# Patient Record
Sex: MALE | Race: WHITE | Employment: FULL TIME | URBAN - METROPOLITAN AREA
[De-identification: names, ages, dates, MRNs, and addresses within clinical notes are randomized per-mention and may not be internally consistent; named-entity substitution may affect disease eponyms.]

---

## 2017-03-25 ENCOUNTER — OFFICE VISIT (OUTPATIENT)
Dept: FAMILY MEDICINE CLINIC | Facility: CLINIC | Age: 37
End: 2017-03-25

## 2017-03-25 VITALS
BODY MASS INDEX: 18 KG/M2 | WEIGHT: 128 LBS | TEMPERATURE: 99 F | SYSTOLIC BLOOD PRESSURE: 112 MMHG | DIASTOLIC BLOOD PRESSURE: 60 MMHG

## 2017-03-25 DIAGNOSIS — H65.93 OME (OTITIS MEDIA WITH EFFUSION), BILATERAL: ICD-10-CM

## 2017-03-25 DIAGNOSIS — J01.01 ACUTE RECURRENT MAXILLARY SINUSITIS: Primary | ICD-10-CM

## 2017-03-25 PROCEDURE — 99213 OFFICE O/P EST LOW 20 MIN: CPT | Performed by: PHYSICIAN ASSISTANT

## 2017-03-25 RX ORDER — MOMETASONE 50 UG/1
2 SPRAY, METERED NASAL DAILY
Qty: 17 G | Refills: 0 | Status: SHIPPED | OUTPATIENT
Start: 2017-03-25 | End: 2019-06-05

## 2017-03-25 RX ORDER — CEFDINIR 300 MG/1
300 CAPSULE ORAL 2 TIMES DAILY
Qty: 20 CAPSULE | Refills: 0 | Status: SHIPPED | OUTPATIENT
Start: 2017-03-25 | End: 2017-04-04

## 2017-03-25 NOTE — PROGRESS NOTES
CHIEF COMPLAINT:   Patient presents with:  Cough/URI: x 8 days, increased left sided facial pressure c/w previous h/o sinusitis, thick post nasal drainage, nasal congestion, L ear popping/pressure      HPI:   Pipo Ness is a 39year old male who pr • Other nonspecific abnormal serum enzyme levels      Alkaline Phosphatase Elevated   • Sinusitis    • Vitamin D deficiency    • Ulcer (HCC)           Past Surgical History    OTHER SURGICAL HISTORY      Comment Ileostomy, created J pouch and connected to LUNGS: clear to auscultation bilaterally, no wheezes or rhonchi. Breathing is non labored. CARDIO: RRR without murmur  EXTREMITIES: no cyanosis, clubbing or edema  LYMPH:  (+)ant cervical lymphadenopathy.         ASSESSMENT AND PLAN:   Dennise Bñauelos i 3. Encourage fluids, humidifier/vaporizor at bedside, elevate head of bed (sleep with extra pillow), vapor rub to chest, steam therapy if no fever, warm compresses for sinus pressure if no fever, salt water gargles for sore throat, lozenges for sore throat Your doctor may prescribe medications to help treat your sinusitis. If you have an infection, antibiotics can help clear it up. If you are prescribed antibiotics, take all pills on schedule until they are gone, even if you feel better.  Decongestants help r

## 2017-03-25 NOTE — PATIENT INSTRUCTIONS
1.  Omnicef 300 mg twice daily for 10 days. Recommend probiotics while on this medication to prevent antibiotics associated diarrhea or yeast infections.   Examples include yogurt with active live cultures; or in capsule/granule forms such as Florastor, Cu Rinses help keep your sinuses and nose moist. Mix a teaspoon of salt in 8 ounces of fresh, warm water. Use a bulb syringe to gently squirt the water into your nose a few times a day. You can also buy ready-made saline nasal sprays.   Apply hot or cold packs

## 2017-06-16 ENCOUNTER — OFFICE VISIT (OUTPATIENT)
Dept: FAMILY MEDICINE CLINIC | Facility: CLINIC | Age: 37
End: 2017-06-16

## 2017-06-16 VITALS
DIASTOLIC BLOOD PRESSURE: 86 MMHG | SYSTOLIC BLOOD PRESSURE: 124 MMHG | OXYGEN SATURATION: 99 % | RESPIRATION RATE: 16 BRPM | TEMPERATURE: 99 F | HEART RATE: 110 BPM

## 2017-06-16 DIAGNOSIS — H60.502 ACUTE OTITIS EXTERNA OF LEFT EAR, UNSPECIFIED TYPE: ICD-10-CM

## 2017-06-16 DIAGNOSIS — J01.40 ACUTE NON-RECURRENT PANSINUSITIS: Primary | ICD-10-CM

## 2017-06-16 DIAGNOSIS — Z87.09 HISTORY OF BRONCHIECTASIS: ICD-10-CM

## 2017-06-16 DIAGNOSIS — J04.0 LARYNGITIS: ICD-10-CM

## 2017-06-16 DIAGNOSIS — R05.9 COUGH: ICD-10-CM

## 2017-06-16 PROCEDURE — 99213 OFFICE O/P EST LOW 20 MIN: CPT | Performed by: PHYSICIAN ASSISTANT

## 2017-06-16 RX ORDER — PREDNISONE 20 MG/1
20 TABLET ORAL DAILY
Qty: 5 TABLET | Refills: 0 | Status: SHIPPED | OUTPATIENT
Start: 2017-06-16 | End: 2017-06-21

## 2017-06-16 RX ORDER — OFLOXACIN 3 MG/ML
5 SOLUTION AURICULAR (OTIC) DAILY
Qty: 1 BOTTLE | Refills: 0 | Status: SHIPPED | OUTPATIENT
Start: 2017-06-16 | End: 2017-06-23

## 2017-06-16 RX ORDER — AMOXICILLIN AND CLAVULANATE POTASSIUM 875; 125 MG/1; MG/1
1 TABLET, FILM COATED ORAL 2 TIMES DAILY
Qty: 14 TABLET | Refills: 0 | Status: SHIPPED | OUTPATIENT
Start: 2017-06-16 | End: 2017-06-23

## 2017-06-16 NOTE — PROGRESS NOTES
CHIEF COMPLAINT:   Patient presents with:  Sinus Problem: 6 days, sinus pressure, hx of recurrent sinus infecitons in the past, sinus pressure in frontal and left side with tenderness, blowing out yellow/green phlegm  Voice Problem: lost voice last night 1000 UNITS Oral Cap Take  by mouth daily. Disp:  Rfl:    DAILY MULTIVITAMIN OR Take  by mouth daily.  Disp:  Rfl:       Past Medical History   Diagnosis Date   • Iron deficiency    • Prostatitis    • Renal tubular acidosis    • Other nonspecific abnormal se retraction bilaterally,bony landmarks intact  NOSE: nostrils patent, turbinates with mild swelling greater on the left, clear/yellow nasal mucous, nasal mucosa pink and moist  THROAT: oral mucosa pink and moist. No visible dental caries.  Posterior pharynx BASILIO  6/16/2017  10:23 AM

## 2017-06-16 NOTE — PATIENT INSTRUCTIONS
Self-Care for Sinusitis     Drinking plenty of water can help sinuses drain. Sinusitis can often be managed with self-care. Self-care can keep sinuses moist and make you feel more comfortable. Remember to follow your doctor's instructions closely.  This Understanding Bronchiectasis  Bronchiectasis is a condition in which the airways of the lungs (bronchi and bronchioles) become wider than normal. Over time the walls of the airways become thick and scarred. The damaged airways can’t clear mucus as well.  Be Your healthcare provider will ask about your past health and symptoms. You’ll also have a physical exam. This includes listening to your chest with a stethoscope. You may need tests to help with the diagnosis, including:  · Blood tests.  These check for inf

## 2018-01-27 ENCOUNTER — HOSPITAL ENCOUNTER (OUTPATIENT)
Age: 38
Discharge: HOME | End: 2018-01-27
Payer: COMMERCIAL

## 2018-01-27 VITALS
TEMPERATURE: 99 F | SYSTOLIC BLOOD PRESSURE: 100 MMHG | OXYGEN SATURATION: 98 % | DIASTOLIC BLOOD PRESSURE: 79 MMHG | HEART RATE: 89 BPM | RESPIRATION RATE: 20 BRPM

## 2018-01-27 DIAGNOSIS — J32.1 CHRONIC FRONTAL SINUSITIS: Primary | ICD-10-CM

## 2018-01-27 PROCEDURE — 99213 OFFICE O/P EST LOW 20 MIN: CPT

## 2018-01-27 PROCEDURE — 99204 OFFICE O/P NEW MOD 45 MIN: CPT

## 2018-01-27 RX ORDER — AMOXICILLIN AND CLAVULANATE POTASSIUM 875; 125 MG/1; MG/1
1 TABLET, FILM COATED ORAL 2 TIMES DAILY
Qty: 20 TABLET | Refills: 0 | Status: SHIPPED | OUTPATIENT
Start: 2018-01-27 | End: 2018-02-06

## 2018-01-27 NOTE — ED PROVIDER NOTES
[place] [id number] [facility] [place]   Patient presents with:  Cough    Stated Complaint: CONGESTION    80-year-old male who presents to the immediate care with complaints of sinus pain/pressure/congestion ×3 weeks.   Patient states Smokeless tobacco: Never Used                      Alcohol use: Yes           0.0 oz/week     Comment: occasional      Review of Systems   Constitutional: Negative for chills.    HENT: Positive for congestion, postnasal drip, sinus pain and sinus pres Eyes: Conjunctivae and EOM are normal. Pupils are equal, round, and reactive to light. Neck: Normal range of motion. Neck supple. Cardiovascular: Normal rate, regular rhythm and normal heart sounds.     Pulmonary/Chest: Effort normal and breath sounds

## 2018-03-26 ENCOUNTER — OFFICE VISIT (OUTPATIENT)
Dept: FAMILY MEDICINE CLINIC | Facility: CLINIC | Age: 38
End: 2018-03-26

## 2018-03-26 VITALS
SYSTOLIC BLOOD PRESSURE: 100 MMHG | WEIGHT: 135 LBS | TEMPERATURE: 98 F | HEART RATE: 80 BPM | OXYGEN SATURATION: 98 % | BODY MASS INDEX: 19 KG/M2 | DIASTOLIC BLOOD PRESSURE: 70 MMHG

## 2018-03-26 DIAGNOSIS — J01.00 ACUTE NON-RECURRENT MAXILLARY SINUSITIS: Primary | ICD-10-CM

## 2018-03-26 PROCEDURE — 99213 OFFICE O/P EST LOW 20 MIN: CPT | Performed by: FAMILY MEDICINE

## 2018-03-26 RX ORDER — MOMETASONE 50 UG/1
2 SPRAY, METERED NASAL DAILY
Qty: 1 BOTTLE | Refills: 1 | Status: SHIPPED | OUTPATIENT
Start: 2018-03-26 | End: 2018-04-25

## 2018-03-26 RX ORDER — CEFDINIR 300 MG/1
300 CAPSULE ORAL 2 TIMES DAILY
Qty: 20 CAPSULE | Refills: 0 | Status: SHIPPED | OUTPATIENT
Start: 2018-03-26 | End: 2018-04-24 | Stop reason: ALTCHOICE

## 2018-03-26 RX ORDER — PREDNISONE 20 MG/1
TABLET ORAL
Qty: 10 TABLET | Refills: 0 | Status: SHIPPED | OUTPATIENT
Start: 2018-03-26 | End: 2018-04-24 | Stop reason: ALTCHOICE

## 2018-03-26 NOTE — PATIENT INSTRUCTIONS
Take antibiotics with food and plenty of water. Eat yogurt or take probiotic daily. (Vyelmira Ossipee is a good example of an OTC probiotic)  Make sure to finish the entire antibiotic treatment. Take prednisone-- 2 tabs once daily for 5 days. Take with food.

## 2018-03-26 NOTE — PROGRESS NOTES
CHIEF COMPLAINT:   Patient presents with:  Sore Throat:  x 3 days. congestion, right side of throat pain, right ear and sinus presure. dark green nasal d/c. using mucinex to loosen mucus. hx of sinusitis.       HPI:   Annette Hodge is a 40year old mal • Sinusitis    • Ulcer    • Vitamin D deficiency       Past Surgical History:  12/06/2011: COLONOSCOPY  No date: OTHER SURGICAL HISTORY      Comment: Ileostomy, created J pouch and connected to                ileum                       12/23/2014: Ananth Romero RRR without murmur  EXTREMITIES: no cyanosis, clubbing or edema  LYMPH:  Mild ac/tonsillar lymphadenopathy.     NEURO:  No focal deficits      ASSESSMENT AND PLAN:   Acute non-recurrent maxillary sinusitis  (primary encounter diagnosis)    No orders of the

## 2018-04-24 ENCOUNTER — OFFICE VISIT (OUTPATIENT)
Dept: FAMILY MEDICINE CLINIC | Facility: CLINIC | Age: 38
End: 2018-04-24

## 2018-04-24 VITALS
SYSTOLIC BLOOD PRESSURE: 102 MMHG | HEIGHT: 70 IN | WEIGHT: 140 LBS | HEART RATE: 85 BPM | TEMPERATURE: 99 F | RESPIRATION RATE: 14 BRPM | DIASTOLIC BLOOD PRESSURE: 68 MMHG | OXYGEN SATURATION: 98 % | BODY MASS INDEX: 20.04 KG/M2

## 2018-04-24 DIAGNOSIS — J20.9 ACUTE BRONCHITIS WITH SYMPTOMS GREATER THAN 10 DAYS: Primary | ICD-10-CM

## 2018-04-24 PROCEDURE — 99213 OFFICE O/P EST LOW 20 MIN: CPT | Performed by: NURSE PRACTITIONER

## 2018-04-24 RX ORDER — AZITHROMYCIN 250 MG/1
TABLET, FILM COATED ORAL
Qty: 6 TABLET | Refills: 0 | Status: SHIPPED | OUTPATIENT
Start: 2018-04-24 | End: 2019-01-07

## 2018-04-24 NOTE — PATIENT INSTRUCTIONS
Rest and fluids  Mucinex DM as packet insert   Continue asthma inhalers as prescribed by PCP. May use cough drops as needed.     Antibiotics as prescribed       Follow-up with PCP if not improving       Bronchitis, Antibiotic Treatment (Adult)    Bronchit · Over-the-counter cough, cold, and sore-throat medicines will not shorten the length of the illness, but they may be helpful to reduce symptoms. (Note: Do not use decongestants if you have high blood pressure.)  · Finish all antibiotic medicine.  Do this e

## 2018-04-24 NOTE — PROGRESS NOTES
CHIEF COMPLAINT:   Patient presents with:  Sore Throat: hoarse voice, cough with phlegm worse in morning, green mucus x 3 wks        HPI:   Woo Bergman is a 40year old male who presents for cough for  3  weeks.   Cough started gradually and is christiano Past Medical History:   Diagnosis Date   • Iron deficiency    • Other nonspecific abnormal serum enzyme levels     Alkaline Phosphatase Elevated   • Prostatitis    • Renal tubular acidosis    • Sinusitis    • Ulcer    • Vitamin D deficiency       Social The patient is asked to follow-up if no improvement in 2-3 days or sooner if sx worsen. Signed Prescriptions Disp Refills    azithromycin 250 MG Oral Tab 6 tablet 0      Sig: Take two tablets by mouth today, then one daily.              Patient Instru · Your appetite may be poor, so a light diet is fine. Avoid dehydration by drinking 6 to 8 glasses of fluids per day (such as water, soft drinks, sports drinks, juices, tea, or soup). Extra fluids will help loosen secretions in the nose and lungs.   · Over-

## 2018-07-30 ENCOUNTER — HOSPITAL ENCOUNTER (OUTPATIENT)
Dept: GENERAL RADIOLOGY | Facility: HOSPITAL | Age: 38
Discharge: HOME | End: 2018-07-30
Attending: INTERNAL MEDICINE
Payer: COMMERCIAL

## 2018-07-30 DIAGNOSIS — J47.9 BRONCHIECTASIS (HCC): ICD-10-CM

## 2018-07-30 PROCEDURE — 71046 X-RAY EXAM CHEST 2 VIEWS: CPT | Performed by: INTERNAL MEDICINE

## 2019-01-07 ENCOUNTER — OFFICE VISIT (OUTPATIENT)
Dept: FAMILY MEDICINE CLINIC | Facility: CLINIC | Age: 39
End: 2019-01-07
Payer: COMMERCIAL

## 2019-01-07 VITALS
DIASTOLIC BLOOD PRESSURE: 80 MMHG | BODY MASS INDEX: 21 KG/M2 | TEMPERATURE: 99 F | SYSTOLIC BLOOD PRESSURE: 116 MMHG | RESPIRATION RATE: 18 BRPM | OXYGEN SATURATION: 99 % | WEIGHT: 149 LBS | HEART RATE: 74 BPM

## 2019-01-07 DIAGNOSIS — J01.00 ACUTE NON-RECURRENT MAXILLARY SINUSITIS: Primary | ICD-10-CM

## 2019-01-07 DIAGNOSIS — J40 BRONCHITIS: ICD-10-CM

## 2019-01-07 PROCEDURE — 99213 OFFICE O/P EST LOW 20 MIN: CPT | Performed by: FAMILY MEDICINE

## 2019-01-07 RX ORDER — CEFDINIR 300 MG/1
300 CAPSULE ORAL 2 TIMES DAILY
Qty: 20 CAPSULE | Refills: 0 | Status: SHIPPED | OUTPATIENT
Start: 2019-01-07 | End: 2019-06-05

## 2019-01-07 RX ORDER — PREDNISONE 20 MG/1
TABLET ORAL
Qty: 10 TABLET | Refills: 0 | Status: SHIPPED | OUTPATIENT
Start: 2019-01-07 | End: 2019-06-05

## 2019-01-07 NOTE — PATIENT INSTRUCTIONS
Take antibiotics with food and plenty of water. Eat yogurt or take probiotic daily. (Adina Lovell is a good example of an OTC probiotic)  Make sure to finish the entire antibiotic treatment. Take prednisone-- 2 tabs once daily for 5 days. Take with food.    Elder Corona

## 2019-01-07 NOTE — PROGRESS NOTES
CHIEF COMPLAINT:   Patient presents with:  Chest Congestion: sore throat, chest pain/pressure, wet cough, post nasal drip, x 4 wks       HPI:   Floresita Real is a 45year old male who presents for sinus congestion and drainage for  4  weeks with wors • FLEXIBLE SIGMOIDOSCOPY, POSSIBLE BIOPSY, POSSIBLE POLYPECTOMY N/A 12/23/2014    Performed by Jb Munroe MD at [facility]   • OTHER SURGICAL HISTORY      Ileostomy, created J pouch and connected to ileum                        • TO AND PLAN:   Acute non-recurrent maxillary sinusitis  (primary encounter diagnosis)  Bronchitis    No orders of the defined types were placed in this encounter.       Meds & Refills for this Visit:  Requested Prescriptions     Signed Prescriptions Disp Refil

## 2019-06-05 ENCOUNTER — OFFICE VISIT (OUTPATIENT)
Dept: FAMILY MEDICINE CLINIC | Facility: CLINIC | Age: 39
End: 2019-06-05
Payer: COMMERCIAL

## 2019-06-05 VITALS
SYSTOLIC BLOOD PRESSURE: 122 MMHG | HEART RATE: 92 BPM | WEIGHT: 150 LBS | OXYGEN SATURATION: 98 % | HEIGHT: 70 IN | TEMPERATURE: 99 F | DIASTOLIC BLOOD PRESSURE: 80 MMHG | RESPIRATION RATE: 16 BRPM | BODY MASS INDEX: 21.47 KG/M2

## 2019-06-05 DIAGNOSIS — J01.40 ACUTE NON-RECURRENT PANSINUSITIS: Primary | ICD-10-CM

## 2019-06-05 DIAGNOSIS — J20.9 BRONCHITIS WITH BRONCHOSPASM: ICD-10-CM

## 2019-06-05 PROCEDURE — 99213 OFFICE O/P EST LOW 20 MIN: CPT | Performed by: NURSE PRACTITIONER

## 2019-06-05 RX ORDER — ALBUTEROL SULFATE 2.5 MG/3ML
2.5 SOLUTION RESPIRATORY (INHALATION) EVERY 4 HOURS PRN
Qty: 1 CONTAINER | Refills: 1 | Status: SHIPPED | OUTPATIENT
Start: 2019-06-05 | End: 2019-06-05

## 2019-06-05 RX ORDER — CEFDINIR 300 MG/1
300 CAPSULE ORAL 2 TIMES DAILY
Qty: 20 CAPSULE | Refills: 0 | Status: SHIPPED | OUTPATIENT
Start: 2019-06-05 | End: 2019-06-15

## 2019-06-05 RX ORDER — PREDNISONE 20 MG/1
TABLET ORAL
Qty: 10 TABLET | Refills: 0 | Status: SHIPPED | OUTPATIENT
Start: 2019-06-05 | End: 2019-11-27 | Stop reason: ALTCHOICE

## 2019-06-05 RX ORDER — ALBUTEROL SULFATE 2.5 MG/3ML
2.5 SOLUTION RESPIRATORY (INHALATION) EVERY 4 HOURS PRN
Qty: 1 CONTAINER | Refills: 1 | Status: SHIPPED | OUTPATIENT
Start: 2019-06-05

## 2019-06-09 NOTE — PROGRESS NOTES
CHIEF COMPLAINT:   \" Patient presents with:  Sinus Problem: sinus pressure, cough, ear plugged, chest pain when cough, x x 5 days     HPI:   Miguel Brar is a 45year old male who presents with a hx of Frontal and Maxillary sinus congestion and pre SIGMOIDOSCOPY, POSSIBLE BIOPSY, POSSIBLE POLYPECTOMY N/A 12/23/2014    Performed by Deandra Levy MD at [facility]   • OTHER SURGICAL HISTORY      Ileostomy, created J pouch and connected to ileum                        • TONSILLECTOMY mouth 2 (two) times daily for 10 days. Dispense: 20 capsule; Refill:   - Pt states that he does not tolerated nasal steroid sprays. Nose bleeds. 2. Bronchitis with bronchospasm    - predniSONE 20 MG Oral Tab;  Take 1 tablet po bid for 5 days  Dispense:

## 2019-07-31 ENCOUNTER — HOSPITAL ENCOUNTER (OUTPATIENT)
Dept: GENERAL RADIOLOGY | Facility: HOSPITAL | Age: 39
Discharge: HOME | End: 2019-07-31
Attending: INTERNAL MEDICINE
Payer: COMMERCIAL

## 2019-07-31 DIAGNOSIS — J47.9 BRONCHIECTASIS (HCC): ICD-10-CM

## 2019-07-31 DIAGNOSIS — E88.01 AAT (ALPHA-1-ANTITRYPSIN) DEFICIENCY (HCC): ICD-10-CM

## 2019-07-31 PROCEDURE — 71046 X-RAY EXAM CHEST 2 VIEWS: CPT | Performed by: INTERNAL MEDICINE

## 2019-11-27 ENCOUNTER — LAB ENCOUNTER (OUTPATIENT)
Dept: LAB | Age: 39
End: 2019-11-27
Attending: INTERNAL MEDICINE
Payer: COMMERCIAL

## 2019-11-27 ENCOUNTER — OFFICE VISIT (OUTPATIENT)
Dept: INTERNAL MEDICINE CLINIC | Facility: CLINIC | Age: 39
End: 2019-11-27
Payer: COMMERCIAL

## 2019-11-27 VITALS
HEART RATE: 70 BPM | RESPIRATION RATE: 16 BRPM | DIASTOLIC BLOOD PRESSURE: 68 MMHG | TEMPERATURE: 98 F | SYSTOLIC BLOOD PRESSURE: 122 MMHG | OXYGEN SATURATION: 99 % | BODY MASS INDEX: 22.66 KG/M2 | HEIGHT: 69 IN | WEIGHT: 153 LBS

## 2019-11-27 DIAGNOSIS — Z00.00 LABORATORY EXAMINATION ORDERED AS PART OF A ROUTINE GENERAL MEDICAL EXAMINATION: ICD-10-CM

## 2019-11-27 DIAGNOSIS — Z13.220 SCREENING CHOLESTEROL LEVEL: ICD-10-CM

## 2019-11-27 DIAGNOSIS — Z00.00 PE (PHYSICAL EXAM), ANNUAL: Primary | ICD-10-CM

## 2019-11-27 DIAGNOSIS — Z13.29 SCREENING FOR THYROID DISORDER: ICD-10-CM

## 2019-11-27 PROCEDURE — 99385 PREV VISIT NEW AGE 18-39: CPT | Performed by: INTERNAL MEDICINE

## 2019-11-27 PROCEDURE — 80050 GENERAL HEALTH PANEL: CPT | Performed by: INTERNAL MEDICINE

## 2019-11-27 PROCEDURE — 80061 LIPID PANEL: CPT | Performed by: INTERNAL MEDICINE

## 2019-11-27 RX ORDER — MOMETASONE 50 UG/1
2 SPRAY, METERED NASAL DAILY
Qty: 3 INHALER | Refills: 3 | Status: SHIPPED | OUTPATIENT
Start: 2019-11-27 | End: 2021-04-22

## 2019-11-27 NOTE — PROGRESS NOTES
Patient presents with:  Physical: RG rm 8 Physical      HPI:  Here for cpe. Stable chronic conditions, taking meds no se's. Due for labs. Doing flu shot at work next week. Review of Systems   Constitutional: Negative for fever, chills and fatigue.  No d SURGICAL HISTORY      Ileostomy, created J pouch and connected to ileum                        • TONSILLECTOMY       Family History   Problem Relation Age of Onset   • Cancer Other         Ovarian, Fam hx   • Other (Parkinson's disease) Other         Fam h Hearing and tympanic membranes normal.  Nose normal. Oropharynx is clear and moist.   Eyes: Conjunctivae and EOM are normal. PERRLA. No scleral icterus. Neck: Normal range of motion. Neck supple. Normal carotid pulses and no JVD present.  No edema present

## 2019-12-16 DIAGNOSIS — R73.09 ELEVATED GLUCOSE: Primary | ICD-10-CM

## 2019-12-17 ENCOUNTER — LAB ENCOUNTER (OUTPATIENT)
Dept: LAB | Age: 39
End: 2019-12-17
Attending: INTERNAL MEDICINE
Payer: COMMERCIAL

## 2019-12-17 DIAGNOSIS — R73.09 ELEVATED GLUCOSE: ICD-10-CM

## 2019-12-17 PROCEDURE — 83036 HEMOGLOBIN GLYCOSYLATED A1C: CPT | Performed by: INTERNAL MEDICINE

## 2020-02-24 ENCOUNTER — OFFICE VISIT (OUTPATIENT)
Dept: FAMILY MEDICINE CLINIC | Facility: CLINIC | Age: 40
End: 2020-02-24
Payer: COMMERCIAL

## 2020-02-24 VITALS
RESPIRATION RATE: 22 BRPM | OXYGEN SATURATION: 98 % | HEART RATE: 88 BPM | SYSTOLIC BLOOD PRESSURE: 120 MMHG | HEIGHT: 69 IN | WEIGHT: 162 LBS | DIASTOLIC BLOOD PRESSURE: 80 MMHG | TEMPERATURE: 99 F | BODY MASS INDEX: 23.99 KG/M2

## 2020-02-24 DIAGNOSIS — J01.00 ACUTE NON-RECURRENT MAXILLARY SINUSITIS: Primary | ICD-10-CM

## 2020-02-24 PROCEDURE — 99213 OFFICE O/P EST LOW 20 MIN: CPT | Performed by: FAMILY MEDICINE

## 2020-02-24 RX ORDER — CEFDINIR 300 MG/1
300 CAPSULE ORAL 2 TIMES DAILY
Qty: 20 CAPSULE | Refills: 0 | Status: SHIPPED | OUTPATIENT
Start: 2020-02-24 | End: 2021-05-22

## 2020-02-24 RX ORDER — PREDNISONE 20 MG/1
TABLET ORAL
Qty: 10 TABLET | Refills: 0 | Status: SHIPPED | OUTPATIENT
Start: 2020-02-24 | End: 2021-05-22

## 2020-02-24 NOTE — PROGRESS NOTES
CHIEF COMPLAINT:   Patient presents with:  Nasal Congestion: Coughing up flem, sinus pressure, headache, post nasal drip, ears clogged, X  5 days       HPI:   Jeana Terry is a 44year old male who presents for sinus congestion for  5  days.  Symptom needed for Wheezing.         Past Medical History:   Diagnosis Date   • Iron deficiency    • Other nonspecific abnormal serum enzyme levels     Alkaline Phosphatase Elevated   • Prostatitis    • Renal tubular acidosis    • Sinusitis    • Ulcer    • Vitamin is not erythematous. no exudates. NECK: supple, non-tender  LUNGS: clear to auscultation bilaterally, no wheezes or rhonchi. Breathing is non labored.   CARDIO: RRR without murmur  EXTREMITIES: no cyanosis, clubbing or edema  LYMPH:  Mild ac/tonsillar lymp

## 2020-02-24 NOTE — PATIENT INSTRUCTIONS
Take antibiotics with food and plenty of water. Eat yogurt or take probiotic daily. (Blue Santamaria is a good example of an OTC probiotic)  Make sure to finish the entire antibiotic treatment.   Increase fluids and rest.       Use OTC meds for comfort as needed--

## 2020-08-12 ENCOUNTER — HOSPITAL ENCOUNTER (OUTPATIENT)
Dept: CT IMAGING | Facility: HOSPITAL | Age: 40
Discharge: HOME | End: 2020-08-12
Attending: INTERNAL MEDICINE
Payer: COMMERCIAL

## 2020-08-12 DIAGNOSIS — J47.9 BRONCHIECTASIS WITHOUT COMPLICATION (HCC): ICD-10-CM

## 2020-08-12 DIAGNOSIS — E88.01 ALPHA-1-ANTITRYPSIN DEFICIENCY (HCC): ICD-10-CM

## 2020-08-12 PROCEDURE — 71250 CT THORAX DX C-: CPT | Performed by: INTERNAL MEDICINE

## 2021-04-14 ENCOUNTER — TELEPHONE (OUTPATIENT)
Dept: INTERNAL MEDICINE CLINIC | Facility: CLINIC | Age: 41
End: 2021-04-14

## 2021-04-14 DIAGNOSIS — Z13.220 SCREENING FOR LIPID DISORDERS: ICD-10-CM

## 2021-04-14 DIAGNOSIS — Z13.228 SCREENING FOR METABOLIC DISORDER: ICD-10-CM

## 2021-04-14 DIAGNOSIS — Z13.29 SCREENING FOR THYROID DISORDER: ICD-10-CM

## 2021-04-14 DIAGNOSIS — Z00.00 ROUTINE GENERAL MEDICAL EXAMINATION AT A HEALTH CARE FACILITY: Primary | ICD-10-CM

## 2021-04-14 DIAGNOSIS — Z13.0 SCREENING FOR DISORDER OF BLOOD AND BLOOD-FORMING ORGANS: ICD-10-CM

## 2021-04-14 NOTE — TELEPHONE ENCOUNTER
Future Appointments   Date Time Provider Sheri Diaz   5/22/2021 10:00 AM Shyann Paul MD [facility]     Patient is scheduled for Annual Physical.  Please place orders with Jameson Montes. Patient aware to fast.  No call back required.   Gold

## 2021-04-22 RX ORDER — MOMETASONE 50 UG/1
SPRAY, METERED NASAL
Qty: 51 G | Refills: 0 | Status: SHIPPED | OUTPATIENT
Start: 2021-04-22 | End: 2021-05-22

## 2021-04-22 NOTE — TELEPHONE ENCOUNTER
PASSED per protocol, refill sent.   Last PE 11.27.19-PE scheduled for 5.22.21  Future Appointments   Date Time Provider Sheri Diaz   5/22/2021 10:00 AM Jannis Boast, MD [facility]

## 2021-05-19 ENCOUNTER — LAB ENCOUNTER (OUTPATIENT)
Dept: LAB | Age: 41
End: 2021-05-19
Attending: INTERNAL MEDICINE
Payer: COMMERCIAL

## 2021-05-19 DIAGNOSIS — Z13.220 SCREENING FOR LIPID DISORDERS: ICD-10-CM

## 2021-05-19 DIAGNOSIS — Z13.228 SCREENING FOR METABOLIC DISORDER: ICD-10-CM

## 2021-05-19 DIAGNOSIS — Z00.00 ROUTINE GENERAL MEDICAL EXAMINATION AT A HEALTH CARE FACILITY: ICD-10-CM

## 2021-05-19 DIAGNOSIS — Z13.0 SCREENING FOR DISORDER OF BLOOD AND BLOOD-FORMING ORGANS: ICD-10-CM

## 2021-05-19 DIAGNOSIS — Z13.29 SCREENING FOR THYROID DISORDER: ICD-10-CM

## 2021-05-19 PROCEDURE — 80061 LIPID PANEL: CPT | Performed by: INTERNAL MEDICINE

## 2021-05-19 PROCEDURE — 80050 GENERAL HEALTH PANEL: CPT | Performed by: INTERNAL MEDICINE

## 2021-05-22 ENCOUNTER — OFFICE VISIT (OUTPATIENT)
Dept: INTERNAL MEDICINE CLINIC | Facility: CLINIC | Age: 41
End: 2021-05-22
Payer: COMMERCIAL

## 2021-05-22 VITALS
TEMPERATURE: 98 F | DIASTOLIC BLOOD PRESSURE: 60 MMHG | RESPIRATION RATE: 18 BRPM | WEIGHT: 160.38 LBS | SYSTOLIC BLOOD PRESSURE: 116 MMHG | HEIGHT: 69 IN | BODY MASS INDEX: 23.76 KG/M2

## 2021-05-22 DIAGNOSIS — Z00.00 PE (PHYSICAL EXAM), ANNUAL: Primary | ICD-10-CM

## 2021-05-22 DIAGNOSIS — K51.90 ULCERATIVE COLITIS WITHOUT COMPLICATIONS, UNSPECIFIED LOCATION (HCC): ICD-10-CM

## 2021-05-22 PROCEDURE — 3078F DIAST BP <80 MM HG: CPT | Performed by: INTERNAL MEDICINE

## 2021-05-22 PROCEDURE — 99396 PREV VISIT EST AGE 40-64: CPT | Performed by: INTERNAL MEDICINE

## 2021-05-22 PROCEDURE — 3074F SYST BP LT 130 MM HG: CPT | Performed by: INTERNAL MEDICINE

## 2021-05-22 PROCEDURE — 3008F BODY MASS INDEX DOCD: CPT | Performed by: INTERNAL MEDICINE

## 2021-05-22 RX ORDER — ALBUTEROL SULFATE 90 UG/1
2 AEROSOL, METERED RESPIRATORY (INHALATION) EVERY 6 HOURS PRN
Qty: 1 INHALER | Refills: 1 | Status: SHIPPED | OUTPATIENT
Start: 2021-05-22 | End: 2021-05-24

## 2021-05-22 RX ORDER — MOMETASONE 50 UG/1
SPRAY, METERED NASAL
Qty: 51 G | Refills: 3 | Status: SHIPPED | OUTPATIENT
Start: 2021-05-22

## 2021-05-22 NOTE — PROGRESS NOTES
Patient presents with:  Wellness Visit: MR rm 8 annual pe       HPI:  Here for cpe. No complaints. Stable UC, has not had c-scope for years, overdue. Would like to establish with new gi.     Review of Systems   Constitutional: Negative for fever, chills Procedure: FLEXIBLE SIGMOIDOSCOPY W/WO BIOPSY, W/WO POLYPECTOMY;  Surgeon:  Annabel Hussein MD;  Location: [address] [place] [address]   • TONSILLECTOMY       Family History   Problem Relation Age of Onset   • Cancer Other         Ovarian, Fam hx   • Other Position: Sitting, Cuff Size: adult)   Temp 97.5 °F (36.4 °C) (Temporal)   Resp 18   Ht 5' 9\" (1.753 m)   Wt 160 lb 6.4 oz (72.8 kg)   BMI 23.69 kg/m²   Constitutional: Oriented to person, place, and time. No distress.    HEENT:  Normocephalic and atraumat

## 2021-05-24 RX ORDER — ALBUTEROL SULFATE 90 UG/1
AEROSOL, METERED RESPIRATORY (INHALATION)
Qty: 25.5 G | Refills: 0 | Status: SHIPPED | OUTPATIENT
Start: 2021-05-24

## 2021-07-16 ENCOUNTER — RT VISIT (OUTPATIENT)
Dept: RESPIRATORY THERAPY | Facility: HOSPITAL | Age: 41
End: 2021-07-16
Attending: HOSPITALIST
Payer: COMMERCIAL

## 2021-07-16 DIAGNOSIS — E88.01 ALPHA-1-ANTITRYPSIN DEFICIENCY (HCC): ICD-10-CM

## 2021-07-16 PROCEDURE — 94729 DIFFUSING CAPACITY: CPT

## 2021-07-16 PROCEDURE — 94726 PLETHYSMOGRAPHY LUNG VOLUMES: CPT

## 2021-07-16 PROCEDURE — 94010 BREATHING CAPACITY TEST: CPT

## 2021-07-17 ENCOUNTER — LAB ENCOUNTER (OUTPATIENT)
Dept: LAB | Age: 41
End: 2021-07-17
Attending: INTERNAL MEDICINE
Payer: COMMERCIAL

## 2021-07-17 DIAGNOSIS — Z01.818 PRE-OP TESTING: ICD-10-CM

## 2021-07-18 LAB — SARS-COV-2 RNA RESP QL NAA+PROBE: NOT DETECTED

## 2021-07-18 PROCEDURE — 83993 ASSAY FOR CALPROTECTIN FECAL: CPT

## 2021-07-19 ENCOUNTER — LAB ENCOUNTER (OUTPATIENT)
Dept: LAB | Facility: HOSPITAL | Age: 41
End: 2021-07-19
Attending: INTERNAL MEDICINE
Payer: COMMERCIAL

## 2021-07-19 DIAGNOSIS — K51.50 ULCERATIVE COLITIS, LEFT SIDED, WITHOUT COMPLICATIONS (HCC): ICD-10-CM

## 2021-07-21 NOTE — PROCEDURES
Findings:  FEV1 is 4.85L, 115% predicted. FVC is 5.80L, 111% predicted. FEV1/ FVC ratio is 0.83. The flow-volume loop demonstrates a normal pattern. The TLC is 8.05L, 113% predicted. The residual volume 2.24L, 113% predicted.   The diffusion capacity i

## 2021-07-22 LAB — CALPROTECTIN STL-MCNT: 50.5 ΜG/G (ref ?–50)

## 2022-06-07 ENCOUNTER — TELEPHONE (OUTPATIENT)
Dept: INTERNAL MEDICINE CLINIC | Facility: CLINIC | Age: 42
End: 2022-06-07

## 2022-06-07 DIAGNOSIS — Z13.220 SCREENING FOR LIPID DISORDERS: ICD-10-CM

## 2022-06-07 DIAGNOSIS — Z13.1 SCREENING FOR DIABETES MELLITUS (DM): Primary | ICD-10-CM

## 2022-06-07 DIAGNOSIS — Z13.228 SCREENING FOR ENDOCRINE, NUTRITIONAL, METABOLIC AND IMMUNITY DISORDER: ICD-10-CM

## 2022-06-07 DIAGNOSIS — Z00.00 LABORATORY EXAM ORDERED AS PART OF ROUTINE GENERAL MEDICAL EXAMINATION: ICD-10-CM

## 2022-06-07 DIAGNOSIS — Z13.0 SCREENING FOR ENDOCRINE, NUTRITIONAL, METABOLIC AND IMMUNITY DISORDER: ICD-10-CM

## 2022-06-07 DIAGNOSIS — Z13.29 SCREENING FOR THYROID DISORDER: ICD-10-CM

## 2022-06-07 DIAGNOSIS — Z13.21 SCREENING FOR ENDOCRINE, NUTRITIONAL, METABOLIC AND IMMUNITY DISORDER: ICD-10-CM

## 2022-06-07 DIAGNOSIS — Z13.29 SCREENING FOR ENDOCRINE, NUTRITIONAL, METABOLIC AND IMMUNITY DISORDER: ICD-10-CM

## 2022-06-07 NOTE — TELEPHONE ENCOUNTER
Future Appointments   Date Time Provider Sheri Diaz   9/16/2022 12:30 PM Rika Luciano MD [facility]     Orders to edward- Pt informed that labs need to be completed no sooner than 2 weeks prior to the appt.  Pt aware to fast-no call back required

## 2022-07-11 ENCOUNTER — LAB ENCOUNTER (OUTPATIENT)
Dept: LAB | Facility: HOSPITAL | Age: 42
End: 2022-07-11
Attending: HOSPITALIST
Payer: COMMERCIAL

## 2022-07-11 DIAGNOSIS — Z01.818 PREOP EXAMINATION: ICD-10-CM

## 2022-07-11 DIAGNOSIS — Z11.59 SPECIAL SCREENING EXAMINATION FOR VIRAL DISEASE: ICD-10-CM

## 2022-07-12 LAB — SARS-COV-2 RNA RESP QL NAA+PROBE: NOT DETECTED

## 2022-07-14 ENCOUNTER — RT VISIT (OUTPATIENT)
Dept: RESPIRATORY THERAPY | Facility: HOSPITAL | Age: 42
End: 2022-07-14
Attending: HOSPITALIST
Payer: COMMERCIAL

## 2022-07-14 DIAGNOSIS — Z01.818 PREOP EXAMINATION: ICD-10-CM

## 2022-07-14 PROCEDURE — 94726 PLETHYSMOGRAPHY LUNG VOLUMES: CPT

## 2022-07-14 PROCEDURE — 94729 DIFFUSING CAPACITY: CPT

## 2022-07-14 PROCEDURE — 94010 BREATHING CAPACITY TEST: CPT

## 2022-07-15 NOTE — PROCEDURES
Findings:  FEV1 is 4.77L, 114% predicted. FVC is 5.64L, 108% predicted. FEV1/ FVC ratio is 0.85. The flow-volume loop demonstrates a normal pattern. The TLC is 8.10L, 114% predicted. The residual volume 2.38L, 119% predicted. The diffusion capacity is 69% predicted and 67% predicted when corrected for alveolar volume. Impression:  There is no airway obstruction on spirometry and visualized on flow-volume loop. Lung volumes are normal.  Diffusion capacity is mildly reduced with DLCO of 69%. The isolated decrease in diffusion capacity can be seen in emphysema, interstitial lung disease, pulmonary vascular disease (such as pulmonary hypertension) and anemia. If not already performed, would suggest further evaluation as determined clinically. When compared to previous pulmonary function testing dated 7/16/2021, there has been no significant changes in spirometry, lung volumes or diffusion capacity.

## 2022-07-28 ENCOUNTER — MED REC SCAN ONLY (OUTPATIENT)
Dept: INTERNAL MEDICINE CLINIC | Facility: CLINIC | Age: 42
End: 2022-07-28

## 2022-09-13 ENCOUNTER — LAB ENCOUNTER (OUTPATIENT)
Dept: LAB | Facility: HOSPITAL | Age: 42
End: 2022-09-13
Attending: INTERNAL MEDICINE
Payer: COMMERCIAL

## 2022-09-13 DIAGNOSIS — Z13.228 SCREENING FOR ENDOCRINE, NUTRITIONAL, METABOLIC AND IMMUNITY DISORDER: ICD-10-CM

## 2022-09-13 DIAGNOSIS — Z13.29 SCREENING FOR ENDOCRINE, NUTRITIONAL, METABOLIC AND IMMUNITY DISORDER: ICD-10-CM

## 2022-09-13 DIAGNOSIS — Z13.220 SCREENING FOR LIPID DISORDERS: ICD-10-CM

## 2022-09-13 DIAGNOSIS — Z13.21 SCREENING FOR ENDOCRINE, NUTRITIONAL, METABOLIC AND IMMUNITY DISORDER: ICD-10-CM

## 2022-09-13 DIAGNOSIS — Z00.00 LABORATORY EXAM ORDERED AS PART OF ROUTINE GENERAL MEDICAL EXAMINATION: ICD-10-CM

## 2022-09-13 DIAGNOSIS — Z13.0 SCREENING FOR ENDOCRINE, NUTRITIONAL, METABOLIC AND IMMUNITY DISORDER: ICD-10-CM

## 2022-09-13 DIAGNOSIS — Z13.29 SCREENING FOR THYROID DISORDER: ICD-10-CM

## 2022-09-13 DIAGNOSIS — Z13.1 SCREENING FOR DIABETES MELLITUS (DM): ICD-10-CM

## 2022-09-13 LAB
ALBUMIN SERPL-MCNC: 3.9 G/DL (ref 3.4–5)
ALBUMIN/GLOB SERPL: 1 {RATIO} (ref 1–2)
ALP LIVER SERPL-CCNC: 68 U/L
ALT SERPL-CCNC: 25 U/L
ANION GAP SERPL CALC-SCNC: 4 MMOL/L (ref 0–18)
AST SERPL-CCNC: 17 U/L (ref 15–37)
BASOPHILS # BLD AUTO: 0.07 X10(3) UL (ref 0–0.2)
BASOPHILS NFR BLD AUTO: 0.7 %
BILIRUB SERPL-MCNC: 1.9 MG/DL (ref 0.1–2)
BUN BLD-MCNC: 15 MG/DL (ref 7–18)
CALCIUM BLD-MCNC: 8.8 MG/DL (ref 8.5–10.1)
CHLORIDE SERPL-SCNC: 109 MMOL/L (ref 98–112)
CHOLEST SERPL-MCNC: 121 MG/DL (ref ?–200)
CO2 SERPL-SCNC: 26 MMOL/L (ref 21–32)
CREAT BLD-MCNC: 1.11 MG/DL
EOSINOPHIL # BLD AUTO: 0.09 X10(3) UL (ref 0–0.7)
EOSINOPHIL NFR BLD AUTO: 0.9 %
ERYTHROCYTE [DISTWIDTH] IN BLOOD BY AUTOMATED COUNT: 12.7 %
FASTING PATIENT LIPID ANSWER: YES
FASTING STATUS PATIENT QL REPORTED: YES
GFR SERPLBLD BASED ON 1.73 SQ M-ARVRAT: 86 ML/MIN/1.73M2 (ref 60–?)
GLOBULIN PLAS-MCNC: 3.8 G/DL (ref 2.8–4.4)
GLUCOSE BLD-MCNC: 88 MG/DL (ref 70–99)
HCT VFR BLD AUTO: 45.6 %
HDLC SERPL-MCNC: 53 MG/DL (ref 40–59)
HGB BLD-MCNC: 15.1 G/DL
IMM GRANULOCYTES # BLD AUTO: 0.06 X10(3) UL (ref 0–1)
IMM GRANULOCYTES NFR BLD: 0.6 %
LDLC SERPL CALC-MCNC: 58 MG/DL (ref ?–100)
LYMPHOCYTES # BLD AUTO: 1.39 X10(3) UL (ref 1–4)
LYMPHOCYTES NFR BLD AUTO: 14.1 %
MCH RBC QN AUTO: 31.3 PG (ref 26–34)
MCHC RBC AUTO-ENTMCNC: 33.1 G/DL (ref 31–37)
MCV RBC AUTO: 94.4 FL
MONOCYTES # BLD AUTO: 0.99 X10(3) UL (ref 0.1–1)
MONOCYTES NFR BLD AUTO: 10 %
NEUTROPHILS # BLD AUTO: 7.28 X10 (3) UL (ref 1.5–7.7)
NEUTROPHILS # BLD AUTO: 7.28 X10(3) UL (ref 1.5–7.7)
NEUTROPHILS NFR BLD AUTO: 73.7 %
NONHDLC SERPL-MCNC: 68 MG/DL (ref ?–130)
OSMOLALITY SERPL CALC.SUM OF ELEC: 288 MOSM/KG (ref 275–295)
PLATELET # BLD AUTO: 246 10(3)UL (ref 150–450)
POTASSIUM SERPL-SCNC: 4.5 MMOL/L (ref 3.5–5.1)
PROT SERPL-MCNC: 7.7 G/DL (ref 6.4–8.2)
RBC # BLD AUTO: 4.83 X10(6)UL
SODIUM SERPL-SCNC: 139 MMOL/L (ref 136–145)
TRIGL SERPL-MCNC: 38 MG/DL (ref 30–149)
TSI SER-ACNC: 1.96 MIU/ML (ref 0.36–3.74)
VLDLC SERPL CALC-MCNC: 6 MG/DL (ref 0–30)
WBC # BLD AUTO: 9.9 X10(3) UL (ref 4–11)

## 2022-09-13 PROCEDURE — 85025 COMPLETE CBC W/AUTO DIFF WBC: CPT

## 2022-09-13 PROCEDURE — 80061 LIPID PANEL: CPT

## 2022-09-13 PROCEDURE — 84443 ASSAY THYROID STIM HORMONE: CPT

## 2022-09-13 PROCEDURE — 80053 COMPREHEN METABOLIC PANEL: CPT

## 2022-09-13 PROCEDURE — 36415 COLL VENOUS BLD VENIPUNCTURE: CPT

## 2022-09-16 ENCOUNTER — OFFICE VISIT (OUTPATIENT)
Dept: INTERNAL MEDICINE CLINIC | Facility: CLINIC | Age: 42
End: 2022-09-16
Payer: COMMERCIAL

## 2022-09-16 VITALS
HEIGHT: 68.7 IN | SYSTOLIC BLOOD PRESSURE: 112 MMHG | BODY MASS INDEX: 23.07 KG/M2 | WEIGHT: 154 LBS | DIASTOLIC BLOOD PRESSURE: 70 MMHG | TEMPERATURE: 99 F | HEART RATE: 80 BPM | OXYGEN SATURATION: 98 %

## 2022-09-16 DIAGNOSIS — K51.90 ULCERATIVE COLITIS WITHOUT COMPLICATIONS, UNSPECIFIED LOCATION (HCC): ICD-10-CM

## 2022-09-16 DIAGNOSIS — E88.01 ALPHA-1-ANTITRYPSIN DEFICIENCY (HCC): ICD-10-CM

## 2022-09-16 DIAGNOSIS — Z00.00 PE (PHYSICAL EXAM), ANNUAL: Primary | ICD-10-CM

## 2022-09-16 PROCEDURE — 99396 PREV VISIT EST AGE 40-64: CPT | Performed by: INTERNAL MEDICINE

## 2022-09-16 PROCEDURE — 3074F SYST BP LT 130 MM HG: CPT | Performed by: INTERNAL MEDICINE

## 2022-09-16 PROCEDURE — 3078F DIAST BP <80 MM HG: CPT | Performed by: INTERNAL MEDICINE

## 2022-09-16 PROCEDURE — 3008F BODY MASS INDEX DOCD: CPT | Performed by: INTERNAL MEDICINE

## 2022-09-16 RX ORDER — MOMETASONE FUROATE 50 UG/1
SPRAY, METERED NASAL
Qty: 51 G | Refills: 3 | Status: SHIPPED | OUTPATIENT
Start: 2022-09-16

## 2022-09-16 RX ORDER — ALBUTEROL SULFATE 90 UG/1
2 AEROSOL, METERED RESPIRATORY (INHALATION) EVERY 6 HOURS PRN
Qty: 25.5 G | Refills: 0 | Status: SHIPPED | OUTPATIENT
Start: 2022-09-16

## 2023-01-16 ENCOUNTER — OFFICE VISIT (OUTPATIENT)
Facility: CLINIC | Age: 43
End: 2023-01-16
Payer: COMMERCIAL

## 2023-01-16 VITALS
SYSTOLIC BLOOD PRESSURE: 120 MMHG | HEIGHT: 69 IN | WEIGHT: 161 LBS | HEART RATE: 78 BPM | DIASTOLIC BLOOD PRESSURE: 70 MMHG | RESPIRATION RATE: 16 BRPM | OXYGEN SATURATION: 99 % | BODY MASS INDEX: 23.85 KG/M2

## 2023-01-16 DIAGNOSIS — J47.9 BRONCHIECTASIS WITHOUT COMPLICATION (HCC): Primary | ICD-10-CM

## 2023-01-16 PROCEDURE — 3008F BODY MASS INDEX DOCD: CPT | Performed by: INTERNAL MEDICINE

## 2023-01-16 PROCEDURE — 3074F SYST BP LT 130 MM HG: CPT | Performed by: INTERNAL MEDICINE

## 2023-01-16 PROCEDURE — 3078F DIAST BP <80 MM HG: CPT | Performed by: INTERNAL MEDICINE

## 2023-01-16 PROCEDURE — 99204 OFFICE O/P NEW MOD 45 MIN: CPT | Performed by: INTERNAL MEDICINE

## 2023-01-16 RX ORDER — MONTELUKAST SODIUM 10 MG/1
10 TABLET ORAL NIGHTLY
Qty: 30 TABLET | Refills: 6 | Status: SHIPPED | OUTPATIENT
Start: 2023-01-16

## 2023-01-16 RX ORDER — FLUTICASONE PROPIONATE 50 MCG
1 SPRAY, SUSPENSION (ML) NASAL 2 TIMES DAILY
COMMUNITY

## 2023-01-16 NOTE — PATIENT INSTRUCTIONS
Plan - continue Breo and spiriva           Continue flonase 2 puffs each nostril every night            Use sinus rinse /saline - 2-4 times per day            Continue zyrtec or allegra -- not the D-- every night            - to begin singular every night            - see me in 6 months after  CT chest              -    Lori Rockwell MD  Pulmonary Medicine  1/16/2023

## 2023-05-26 ENCOUNTER — TELEPHONE (OUTPATIENT)
Dept: INTERNAL MEDICINE CLINIC | Facility: CLINIC | Age: 43
End: 2023-05-26

## 2023-05-26 DIAGNOSIS — Z13.220 SCREENING FOR LIPID DISORDERS: ICD-10-CM

## 2023-05-26 DIAGNOSIS — Z13.228 SCREENING FOR METABOLIC DISORDER: ICD-10-CM

## 2023-05-26 DIAGNOSIS — Z13.0 SCREENING FOR DISORDER OF BLOOD AND BLOOD-FORMING ORGANS: ICD-10-CM

## 2023-05-26 DIAGNOSIS — Z00.00 ROUTINE GENERAL MEDICAL EXAMINATION AT A HEALTH CARE FACILITY: Primary | ICD-10-CM

## 2023-05-26 DIAGNOSIS — Z13.29 SCREENING FOR THYROID DISORDER: ICD-10-CM

## 2023-05-26 NOTE — TELEPHONE ENCOUNTER
Pt scheduled for cpe Informed must fast no call back required.  Orders to Irish Liu  Future Appointments   Time Provider Sheri Diaz    9:00 Dominic Roberts MD [facility]

## 2023-07-10 ENCOUNTER — HOSPITAL ENCOUNTER (OUTPATIENT)
Dept: CT IMAGING | Facility: HOSPITAL | Age: 43
Discharge: HOME | End: 2023-07-10
Attending: INTERNAL MEDICINE
Payer: COMMERCIAL

## 2023-07-10 DIAGNOSIS — J47.9 BRONCHIECTASIS WITHOUT COMPLICATION (HCC): ICD-10-CM

## 2023-07-10 PROCEDURE — 71250 CT THORAX DX C-: CPT | Performed by: INTERNAL MEDICINE

## 2023-07-18 ENCOUNTER — OFFICE VISIT (OUTPATIENT)
Facility: CLINIC | Age: 43
End: 2023-07-18
Payer: COMMERCIAL

## 2023-07-18 VITALS
BODY MASS INDEX: 23.55 KG/M2 | OXYGEN SATURATION: 99 % | DIASTOLIC BLOOD PRESSURE: 68 MMHG | RESPIRATION RATE: 16 BRPM | WEIGHT: 159 LBS | HEIGHT: 69 IN | HEART RATE: 80 BPM | SYSTOLIC BLOOD PRESSURE: 120 MMHG

## 2023-07-18 DIAGNOSIS — E88.01 ALPHA-1-ANTITRYPSIN DEFICIENCY (HCC): Primary | ICD-10-CM

## 2023-07-18 PROCEDURE — 3008F BODY MASS INDEX DOCD: CPT | Performed by: INTERNAL MEDICINE

## 2023-07-18 PROCEDURE — 99214 OFFICE O/P EST MOD 30 MIN: CPT | Performed by: INTERNAL MEDICINE

## 2023-07-18 PROCEDURE — 3074F SYST BP LT 130 MM HG: CPT | Performed by: INTERNAL MEDICINE

## 2023-07-18 PROCEDURE — 3078F DIAST BP <80 MM HG: CPT | Performed by: INTERNAL MEDICINE

## 2023-07-18 RX ORDER — FLUTICASONE FUROATE AND VILANTEROL 200; 25 UG/1; UG/1
1 POWDER RESPIRATORY (INHALATION) DAILY
Qty: 1 EACH | Refills: 11 | Status: SHIPPED | OUTPATIENT
Start: 2023-07-18

## 2023-07-18 NOTE — PATIENT INSTRUCTIONS
Plan - continue Breo and spiriva             - ask allergist about epipen            - continue flonase and sinus rinse and allegra /zyrtec and singular             See me in one year - call with issues              -    Sydnee Denton MD  Pulmonary Medicine  21.79.16

## 2023-07-21 ENCOUNTER — TELEPHONE (OUTPATIENT)
Facility: CLINIC | Age: 43
End: 2023-07-21

## 2023-07-21 RX ORDER — EPINEPHRINE 0.3 MG/.3ML
0.3 INJECTION SUBCUTANEOUS ONCE
Qty: 1 EACH | Refills: 0 | Status: SHIPPED | OUTPATIENT
Start: 2023-07-21 | End: 2023-07-21

## 2023-07-21 NOTE — TELEPHONE ENCOUNTER
Per Dr. Chirag Smith and after speaking with allergist, pt informed that due to generalized hives after a bee sting, he should carry an EpiPen. Instructed pt that he should carry with him at all times as a 2 pack and to call 911 if used after a sting. Pt expressed not wanting to pay the money for an Epipen as he understands that they could cost hundreds of dollars. Informed him that there is an alternative called [employer] if EpiPen is too expensive. Pt states he is not sure if he will purchase. Discussed with pt bee sting avoidance and he expressed that he is not going to live his life in fear and have to think about things like altering what he wears or does when outdoors. Attempted to explain that a bee sting can cause life threatening symptoms. Pt stated he did not want to go around and around about this. Advised pt that a prescription for EpiPen would be sent to [facility] and that he could go online to review bee avoidance measures. Pt advised to call if any other questions or concerns. Dr. Chirag Smith notified.

## 2023-08-23 PROBLEM — K91.850 POUCHITIS (HCC): Status: ACTIVE | Noted: 2023-08-23

## 2023-10-18 ENCOUNTER — LAB ENCOUNTER (OUTPATIENT)
Dept: LAB | Facility: HOSPITAL | Age: 43
End: 2023-10-18
Attending: INTERNAL MEDICINE
Payer: COMMERCIAL

## 2023-10-18 DIAGNOSIS — Z00.00 ROUTINE GENERAL MEDICAL EXAMINATION AT A HEALTH CARE FACILITY: ICD-10-CM

## 2023-10-18 DIAGNOSIS — Z13.228 SCREENING FOR METABOLIC DISORDER: ICD-10-CM

## 2023-10-18 DIAGNOSIS — Z13.0 SCREENING FOR DISORDER OF BLOOD AND BLOOD-FORMING ORGANS: ICD-10-CM

## 2023-10-18 DIAGNOSIS — Z13.220 SCREENING FOR LIPID DISORDERS: ICD-10-CM

## 2023-10-18 DIAGNOSIS — Z13.29 SCREENING FOR THYROID DISORDER: ICD-10-CM

## 2023-10-18 LAB
ALBUMIN SERPL-MCNC: 3.8 G/DL (ref 3.4–5)
ALBUMIN/GLOB SERPL: 1 {RATIO} (ref 1–2)
ALP LIVER SERPL-CCNC: 65 U/L
ALT SERPL-CCNC: 29 U/L
ANION GAP SERPL CALC-SCNC: 3 MMOL/L (ref 0–18)
AST SERPL-CCNC: 23 U/L (ref 15–37)
BASOPHILS # BLD AUTO: 0.07 X10(3) UL (ref 0–0.2)
BASOPHILS NFR BLD AUTO: 1 %
BILIRUB SERPL-MCNC: 1.6 MG/DL (ref 0.1–2)
BUN BLD-MCNC: 20 MG/DL (ref 7–18)
CALCIUM BLD-MCNC: 8.6 MG/DL (ref 8.5–10.1)
CHLORIDE SERPL-SCNC: 110 MMOL/L (ref 98–112)
CHOLEST SERPL-MCNC: 138 MG/DL (ref ?–200)
CO2 SERPL-SCNC: 27 MMOL/L (ref 21–32)
CREAT BLD-MCNC: 1.2 MG/DL
EGFRCR SERPLBLD CKD-EPI 2021: 77 ML/MIN/1.73M2 (ref 60–?)
EOSINOPHIL # BLD AUTO: 0.1 X10(3) UL (ref 0–0.7)
EOSINOPHIL NFR BLD AUTO: 1.5 %
ERYTHROCYTE [DISTWIDTH] IN BLOOD BY AUTOMATED COUNT: 12.5 %
FASTING PATIENT LIPID ANSWER: YES
FASTING STATUS PATIENT QL REPORTED: YES
GLOBULIN PLAS-MCNC: 3.9 G/DL (ref 2.8–4.4)
GLUCOSE BLD-MCNC: 78 MG/DL (ref 70–99)
HCT VFR BLD AUTO: 46.2 %
HDLC SERPL-MCNC: 54 MG/DL (ref 40–59)
HGB BLD-MCNC: 15.8 G/DL
IMM GRANULOCYTES # BLD AUTO: 0.03 X10(3) UL (ref 0–1)
IMM GRANULOCYTES NFR BLD: 0.4 %
LDLC SERPL CALC-MCNC: 70 MG/DL (ref ?–100)
LYMPHOCYTES # BLD AUTO: 1.61 X10(3) UL (ref 1–4)
LYMPHOCYTES NFR BLD AUTO: 24.1 %
MCH RBC QN AUTO: 31.4 PG (ref 26–34)
MCHC RBC AUTO-ENTMCNC: 34.2 G/DL (ref 31–37)
MCV RBC AUTO: 91.8 FL
MONOCYTES # BLD AUTO: 0.64 X10(3) UL (ref 0.1–1)
MONOCYTES NFR BLD AUTO: 9.6 %
NEUTROPHILS # BLD AUTO: 4.24 X10 (3) UL (ref 1.5–7.7)
NEUTROPHILS # BLD AUTO: 4.24 X10(3) UL (ref 1.5–7.7)
NEUTROPHILS NFR BLD AUTO: 63.4 %
NONHDLC SERPL-MCNC: 84 MG/DL (ref ?–130)
OSMOLALITY SERPL CALC.SUM OF ELEC: 291 MOSM/KG (ref 275–295)
PLATELET # BLD AUTO: 257 10(3)UL (ref 150–450)
POTASSIUM SERPL-SCNC: 4.2 MMOL/L (ref 3.5–5.1)
PROT SERPL-MCNC: 7.7 G/DL (ref 6.4–8.2)
RBC # BLD AUTO: 5.03 X10(6)UL
SODIUM SERPL-SCNC: 140 MMOL/L (ref 136–145)
TRIGL SERPL-MCNC: 70 MG/DL (ref 30–149)
TSI SER-ACNC: 1.65 MIU/ML (ref 0.36–3.74)
VLDLC SERPL CALC-MCNC: 11 MG/DL (ref 0–30)
WBC # BLD AUTO: 6.7 X10(3) UL (ref 4–11)

## 2023-10-18 PROCEDURE — 80061 LIPID PANEL: CPT

## 2023-10-18 PROCEDURE — 80053 COMPREHEN METABOLIC PANEL: CPT

## 2023-10-18 PROCEDURE — 85025 COMPLETE CBC W/AUTO DIFF WBC: CPT

## 2023-10-18 PROCEDURE — 36415 COLL VENOUS BLD VENIPUNCTURE: CPT

## 2023-10-18 PROCEDURE — 84443 ASSAY THYROID STIM HORMONE: CPT

## 2023-11-03 ENCOUNTER — OFFICE VISIT (OUTPATIENT)
Dept: INTERNAL MEDICINE CLINIC | Facility: CLINIC | Age: 43
End: 2023-11-03
Payer: COMMERCIAL

## 2023-11-03 VITALS
HEIGHT: 69 IN | TEMPERATURE: 98 F | HEART RATE: 78 BPM | DIASTOLIC BLOOD PRESSURE: 74 MMHG | SYSTOLIC BLOOD PRESSURE: 122 MMHG | RESPIRATION RATE: 18 BRPM | BODY MASS INDEX: 23.11 KG/M2 | WEIGHT: 156 LBS | OXYGEN SATURATION: 97 %

## 2023-11-03 DIAGNOSIS — Z91.030 BEE STING ALLERGY: ICD-10-CM

## 2023-11-03 DIAGNOSIS — Z00.00 PE (PHYSICAL EXAM), ANNUAL: Primary | ICD-10-CM

## 2023-11-03 DIAGNOSIS — K91.850 POUCHITIS (HCC): ICD-10-CM

## 2023-11-03 DIAGNOSIS — E88.01 ALPHA-1-ANTITRYPSIN DEFICIENCY (HCC): ICD-10-CM

## 2023-11-03 DIAGNOSIS — K51.90 ULCERATIVE COLITIS WITHOUT COMPLICATIONS, UNSPECIFIED LOCATION (HCC): ICD-10-CM

## 2023-11-03 PROCEDURE — 99396 PREV VISIT EST AGE 40-64: CPT | Performed by: INTERNAL MEDICINE

## 2023-11-03 PROCEDURE — 3008F BODY MASS INDEX DOCD: CPT | Performed by: INTERNAL MEDICINE

## 2023-11-03 PROCEDURE — 3074F SYST BP LT 130 MM HG: CPT | Performed by: INTERNAL MEDICINE

## 2023-11-03 PROCEDURE — 3078F DIAST BP <80 MM HG: CPT | Performed by: INTERNAL MEDICINE

## 2023-11-03 RX ORDER — EPINEPHRINE 0.3 MG/.3ML
0.3 INJECTION SUBCUTANEOUS ONCE
Qty: 1 EACH | Refills: 1 | Status: SHIPPED | OUTPATIENT
Start: 2023-11-03 | End: 2023-11-03

## 2023-11-03 RX ORDER — EPINEPHRINE 0.3 MG/.3ML
0.3 INJECTION SUBCUTANEOUS
COMMUNITY
Start: 2023-07-21

## 2024-07-18 ENCOUNTER — TELEPHONE (OUTPATIENT)
Facility: CLINIC | Age: 44
End: 2024-07-18

## 2024-07-18 ENCOUNTER — OFFICE VISIT (OUTPATIENT)
Facility: CLINIC | Age: 44
End: 2024-07-18
Payer: COMMERCIAL

## 2024-07-18 VITALS
OXYGEN SATURATION: 97 % | WEIGHT: 160.19 LBS | HEART RATE: 64 BPM | RESPIRATION RATE: 18 BRPM | HEIGHT: 69 IN | SYSTOLIC BLOOD PRESSURE: 130 MMHG | DIASTOLIC BLOOD PRESSURE: 68 MMHG | BODY MASS INDEX: 23.73 KG/M2

## 2024-07-18 DIAGNOSIS — E88.01 ALPHA-1-ANTITRYPSIN DEFICIENCY (HCC): Primary | ICD-10-CM

## 2024-07-18 PROCEDURE — 99214 OFFICE O/P EST MOD 30 MIN: CPT | Performed by: INTERNAL MEDICINE

## 2024-07-18 RX ORDER — LORATADINE 10 MG/1
10 TABLET ORAL DAILY
COMMUNITY

## 2024-07-18 RX ORDER — FLUTICASONE FUROATE, UMECLIDINIUM BROMIDE AND VILANTEROL TRIFENATATE 200; 62.5; 25 UG/1; UG/1; UG/1
1 POWDER RESPIRATORY (INHALATION) DAILY
Qty: 1 EACH | Refills: 11 | Status: SHIPPED | OUTPATIENT
Start: 2024-07-18

## 2024-07-18 NOTE — PROGRESS NOTES
Pulmonary Consult Note    History of Present Illness:  Catalino Humphreys is a 43 year old male presenting to pulmonary clinic today for alpha one   Doing well- - over past few years   Breathing the same - always with a lot of mucus - worse if not taking meds -   Thinks from chest- light green - darker in  Winter- - coughs constantly - mucus hourly   ? 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-- [employer] -   No dyspnea -   No cp     In general doing well - - summer is better time - no er/UC   Rare cough -   Hives generalized with bee sting- on Sunday - once prior - no breathing issues with it- benadryl   Breo and spiriva daily - no rescue use at all   Remains on singular -   Remains on allergra and saline - - and flonase productive     7/24  no issues - at all -  Had repeat bee sting 2 months after above and hives all over- no problem breathing though with chest pressure - no lip swelling   No issues with scopes - for August   Breo and spiriva daily - notes difference in 2 days without it-   No rescue   Sinus congestion - saline and claritin- more congested with some foods beer- more sinus   Stopped singular for dreams   2 daYs a week planet fittness- no limitation  Ongoing cough -              Past Medical History:   Past Medical History:    Acidosis    Acute bronchitis    Acute upper respiratory infections of unspecified site    Alpha-1-antitrypsin deficiency (HCC)    Chronic cough    Food intolerance    Had milk alergy my whole life    Iron deficiency    Laboratory examination ordered as part of a routine general medical examination    Other dyspnea and respiratory abnormality    Other nonspecific abnormal serum enzyme levels    Alkaline Phosphatase Elevated    Preop examination    Prostatitis     Renal tubular acidosis    Sinusitis    Sputum production    Ulcer    Vitamin D deficiency    Wears glasses    hx UC- had scope - - no isues -- jpouch 16 yrs ago -2007  - no meds- one flare total since- - for scope in August - dr jarquin-     No cardiac   RTA as a kid- - off bicarb since age 16   Anemia stable   No clots           Past Surgical History:   Past Surgical History:   Procedure Laterality Date    Colonoscopy  12/06/2011    Colonoscopy  2007    Other surgical history      Ileostomy, created J pouch and connected to ileum                         Sigmoidoscopy,biopsy N/A 12/23/2014    Procedure: FLEXIBLE SIGMOIDOSCOPY W/WO BIOPSY, W/WO POLYPECTOMY;  Surgeon: Juan Antonio Garcia MD;  Location: [facility], [facility]    Sigmoidoscopy,diagnostic  December 2016    Tonsillectomy         Family Medical History:   Family History   Problem Relation Age of Onset    Cancer Other         Ovarian, Fam hx    Other (Parkinson's disease) Other         Fam hx    Other Mother         Lukiema since 2016       Social History: Social History    Socioeconomic History      Marital status:     Tobacco Use      Smoking status: Never      Smokeless tobacco: Never    Vaping Use      Vaping Use: Never used    Substance and Sexual Activity      Alcohol use: Yes        Alcohol/week: 0.0 standard drinks        Comment: Socially 2-3 times a month      Drug use: No      Sexual activity: Yes        Partners: Female    Other Topics      Concerns:        Caffeine Concern: Yes        Exercise: No  IT at [facility]     Allergies: Bees, Food, Milk, and Isoflavones     Medications:   Current Outpatient Medications   Medication Sig Dispense Refill    loratadine 10 MG Oral Tab Take 1 tablet (10 mg total) by mouth daily.      EPINEPHrine 0.3 MG/0.3ML Injection Solution Auto-injector 0.3 mL (1 each total).      fluticasone furoate-vilanterol (BREO ELLIPTA) 200-25 MCG/ACT Inhalation Aerosol Powder, Breath Activated Inhale 1 puff  into the lungs daily. 1 each 11    tiotropium 18 MCG Inhalation Cap Inhale 1 capsule (18 mcg total) into the lungs daily. 30 capsule 11    albuterol sulfate (2.5 MG/3ML) 0.083% Inhalation Nebu Soln Take 3 mL (2.5 mg total) by nebulization every 4 (four) hours as needed for Wheezing. 1 Container 1    IRON, FERROUS GLUCONATE, OR Take  by mouth.      CALCIUM 600 OR Take by mouth 2 (two) times a day.      Cholecalciferol (VITAMIN D) 1000 UNITS Oral Cap Take by mouth 2 (two) times a day.      DAILY MULTIVITAMIN OR Take  by mouth daily.      fluticasone propionate 50 MCG/ACT Nasal Suspension 1 spray by Nasal route in the morning and 1 spray before bedtime. (Patient not taking: Reported on 7/18/2024)      mometasone furoate 50 MCG/ACT Nasal Suspension SHAKE LIQUID WELL AND SPRAY TWICE IN EACH NOSTRIL DAILY (Patient not taking: Reported on 7/18/2024) 51 g 3    SPIRIVA HANDIHALER 18 MCG Inhalation Cap Inhale 1 capsule (18 mcg total) into the lungs daily. (Patient not taking: Reported on 7/18/2024)      fluticasone furoate-vilanterol 100-25 MCG/INH Inhalation Aerosol Powder, Breath Activated Inhale 1 puff into the lungs daily. (Patient not taking: Reported on 7/18/2024)         Review of Systems: weight is sl up - 10# in last few years - up 0ne #   Eating well -   No gerd - rare - 2 times a year- takes tums   No skin lesions no hives   No swelling   Sleeping - no issues - no snroing - rested - mostly - dreams with singular now off   Yearly scopes with dr jarquin       Physical Exam:  /68 (BP Location: Right arm, Patient Position: Sitting, Cuff Size: adult)   Pulse 64   Resp 18   Ht 5' 9\" (1.753 m)   Wt 160 lb 3.2 oz (72.7 kg)   SpO2 97%   BMI 23.66 kg/m²    Constitutional: comfortable . No acute distress.   HEENT: Head NC/AT. PEERL. Throat is clear congested nose red and swollen   Cardio: Regular rate and rhythm. Normal S1 and S2. No murmurs.   Respiratory: Thorax symmetrical with no labored breathing. Clear to  ausculation bilaterally with symmetrical breath sounds. No wheezing, rhonchi, rales, or crackles. Clear   GI:  Abd soft, non-tender.  Extremities: No clubbing or cyanosis. No LE edema. No calf tenderness.  Neurologic: A&Ox3. No gross motor deficits.  Skin: Warm, dry.no rashes or hives noted   Lymphatic: No cervical or supraclavicular lymphadenopathy.no jvd   Psych: Calm, cooperative. Pleasant affect.    Results:  Reviewed     Assessment/Plan:  #Alpha-1 antitrypsin deficiency  MZ last level 77 in 2015  Serial PFTs remain without change or improved from 2015 with plans to continue to follow  7/23 remains without symptoms  Repeat CT with minimal cylindrical bronchiectasis at most seems unchanged from prior films no evidence of flare or inflammation no mucus  7/24- remains without sx /limitation - notes significant increase in mucus production-productive of green-if misses 2 days of MDIs--plan to reassess      #History of ulcerative colitis  Remains nearly symptom-free status post J-pouch  Follows with  Dr. Haywood  7/24- due for scopes again-      #Component bronchiectasis clinical asthma  Remains minimal without mucus   As above some green drainage suspect postnasal  To start flutter valve given the volume he describes-plans for repeating PFTs    #Productive cough suspect multifactorial  Unclear if component bronchiectasis-clear lungs  Suspect component significant allergic rhinitis-plans to increase sinus medications and recheck CT  7/23 suspect ongoing allergic rhinitis with plans to continue Flonase, antihistamine and saline  CT without demonstrable cause of cough  7/24- remains with sinus congestion and ongoing meds - notes if not taking MDIs  could cough up 24oz of green -plan to follow and flutter valve/ongoing sinus medication--assess for possible benefit of biologic    Plan - continue Breo and spiriva -- if trelegy is cheaper -- then stop spiriva and breo and take trelegy one puff a day - rinse and spit              - to check alpha level and eos and IgE            - to get pfts in 6 months then see me              - to get flutter valve            - continue flonase and sinus rinse and allegra /zyrtec/claritin            -                          -    Isabel Arias MD  Pulmonary Medicine  07/18/24

## 2024-07-18 NOTE — PATIENT INSTRUCTIONS
Plan - continue Breo and spiriva -- if trelegy is cheaper -- then stop spiriva and breo and take trelegy one puff a day - rinse and spit             - to check alpha level and eos and IgE            - to get pfts in 6 months then see me              - to get flutter valve            - continue flonase and sinus rinse and allegra /zyrtec/claritin            -                          -    Isabel Arias MD  Pulmonary Medicine  07/18/24

## 2024-07-18 NOTE — TELEPHONE ENCOUNTER
Call made to [facility].  Per the pharmacist the copay for Trelegy is $40, he pays $ 50 for the Breo and Spiriva. The pharmacist states he has a lactose allergy. All 3 inhalers have a small amount of Lactose in them. My chart message sent asking if there are issues with taking the Spiriva and Breo. Patient to call back if to let us know if he tolerates the inhalers and if he wants me to have them hold the Trelegy script until the next fill and he can switch.

## 2024-09-23 ENCOUNTER — TELEPHONE (OUTPATIENT)
Dept: INTERNAL MEDICINE CLINIC | Facility: CLINIC | Age: 44
End: 2024-09-23

## 2024-09-23 DIAGNOSIS — Z13.220 SCREENING FOR LIPID DISORDERS: ICD-10-CM

## 2024-09-23 DIAGNOSIS — Z13.0 SCREENING FOR DISORDER OF BLOOD AND BLOOD-FORMING ORGANS: ICD-10-CM

## 2024-09-23 DIAGNOSIS — Z00.00 ROUTINE GENERAL MEDICAL EXAMINATION AT A HEALTH CARE FACILITY: Primary | ICD-10-CM

## 2024-09-23 DIAGNOSIS — Z13.29 SCREENING FOR THYROID DISORDER: ICD-10-CM

## 2024-09-23 DIAGNOSIS — Z13.228 SCREENING FOR METABOLIC DISORDER: ICD-10-CM

## 2024-11-05 ENCOUNTER — LAB ENCOUNTER (OUTPATIENT)
Dept: LAB | Facility: HOSPITAL | Age: 44
End: 2024-11-05
Attending: INTERNAL MEDICINE
Payer: COMMERCIAL

## 2024-11-05 DIAGNOSIS — Z13.29 SCREENING FOR THYROID DISORDER: ICD-10-CM

## 2024-11-05 DIAGNOSIS — Z00.00 ROUTINE GENERAL MEDICAL EXAMINATION AT A HEALTH CARE FACILITY: ICD-10-CM

## 2024-11-05 DIAGNOSIS — Z13.220 SCREENING FOR LIPID DISORDERS: ICD-10-CM

## 2024-11-05 DIAGNOSIS — Z13.0 SCREENING FOR DISORDER OF BLOOD AND BLOOD-FORMING ORGANS: ICD-10-CM

## 2024-11-05 DIAGNOSIS — Z13.228 SCREENING FOR METABOLIC DISORDER: ICD-10-CM

## 2024-11-05 LAB
ALBUMIN SERPL-MCNC: 4.7 G/DL (ref 3.2–4.8)
ALBUMIN/GLOB SERPL: 1.6 {RATIO} (ref 1–2)
ALP LIVER SERPL-CCNC: 74 U/L
ALT SERPL-CCNC: 24 U/L
ANION GAP SERPL CALC-SCNC: 3 MMOL/L (ref 0–18)
AST SERPL-CCNC: 27 U/L (ref ?–34)
BASOPHILS # BLD AUTO: 0.1 X10(3) UL (ref 0–0.2)
BASOPHILS NFR BLD AUTO: 1.1 %
BILIRUB SERPL-MCNC: 1.4 MG/DL (ref 0.3–1.2)
BUN BLD-MCNC: 15 MG/DL (ref 9–23)
CALCIUM BLD-MCNC: 9.4 MG/DL (ref 8.7–10.4)
CHLORIDE SERPL-SCNC: 108 MMOL/L (ref 98–112)
CHOLEST SERPL-MCNC: 153 MG/DL (ref ?–200)
CO2 SERPL-SCNC: 29 MMOL/L (ref 21–32)
CREAT BLD-MCNC: 1.18 MG/DL
EGFRCR SERPLBLD CKD-EPI 2021: 78 ML/MIN/1.73M2 (ref 60–?)
EOSINOPHIL # BLD AUTO: 0.16 X10(3) UL (ref 0–0.7)
EOSINOPHIL NFR BLD AUTO: 1.8 %
ERYTHROCYTE [DISTWIDTH] IN BLOOD BY AUTOMATED COUNT: 12.7 %
FASTING PATIENT LIPID ANSWER: YES
FASTING STATUS PATIENT QL REPORTED: YES
GLOBULIN PLAS-MCNC: 3 G/DL (ref 2–3.5)
GLUCOSE BLD-MCNC: 90 MG/DL (ref 70–99)
HCT VFR BLD AUTO: 45 %
HDLC SERPL-MCNC: 54 MG/DL (ref 40–59)
HGB BLD-MCNC: 14.9 G/DL
IMM GRANULOCYTES # BLD AUTO: 0.04 X10(3) UL (ref 0–1)
IMM GRANULOCYTES NFR BLD: 0.4 %
LDLC SERPL CALC-MCNC: 88 MG/DL (ref ?–100)
LYMPHOCYTES # BLD AUTO: 1.61 X10(3) UL (ref 1–4)
LYMPHOCYTES NFR BLD AUTO: 18.1 %
MCH RBC QN AUTO: 30.4 PG (ref 26–34)
MCHC RBC AUTO-ENTMCNC: 33.1 G/DL (ref 31–37)
MCV RBC AUTO: 91.8 FL
MONOCYTES # BLD AUTO: 0.85 X10(3) UL (ref 0.1–1)
MONOCYTES NFR BLD AUTO: 9.6 %
NEUTROPHILS # BLD AUTO: 6.13 X10 (3) UL (ref 1.5–7.7)
NEUTROPHILS # BLD AUTO: 6.13 X10(3) UL (ref 1.5–7.7)
NEUTROPHILS NFR BLD AUTO: 69 %
NONHDLC SERPL-MCNC: 99 MG/DL (ref ?–130)
OSMOLALITY SERPL CALC.SUM OF ELEC: 290 MOSM/KG (ref 275–295)
PLATELET # BLD AUTO: 261 10(3)UL (ref 150–450)
POTASSIUM SERPL-SCNC: 4.5 MMOL/L (ref 3.5–5.1)
PROT SERPL-MCNC: 7.7 G/DL (ref 5.7–8.2)
RBC # BLD AUTO: 4.9 X10(6)UL
SODIUM SERPL-SCNC: 140 MMOL/L (ref 136–145)
TRIGL SERPL-MCNC: 50 MG/DL (ref 30–149)
TSI SER-ACNC: 1.74 UIU/ML (ref 0.55–4.78)
VLDLC SERPL CALC-MCNC: 8 MG/DL (ref 0–30)
WBC # BLD AUTO: 8.9 X10(3) UL (ref 4–11)

## 2024-11-05 PROCEDURE — 80061 LIPID PANEL: CPT

## 2024-11-05 PROCEDURE — 36415 COLL VENOUS BLD VENIPUNCTURE: CPT

## 2024-11-05 PROCEDURE — 84443 ASSAY THYROID STIM HORMONE: CPT

## 2024-11-05 PROCEDURE — 85025 COMPLETE CBC W/AUTO DIFF WBC: CPT

## 2024-11-05 PROCEDURE — 80053 COMPREHEN METABOLIC PANEL: CPT

## 2024-11-08 ENCOUNTER — OFFICE VISIT (OUTPATIENT)
Dept: INTERNAL MEDICINE CLINIC | Facility: CLINIC | Age: 44
End: 2024-11-08
Payer: COMMERCIAL

## 2024-11-08 VITALS
SYSTOLIC BLOOD PRESSURE: 120 MMHG | RESPIRATION RATE: 18 BRPM | TEMPERATURE: 97 F | DIASTOLIC BLOOD PRESSURE: 70 MMHG | HEIGHT: 69 IN | BODY MASS INDEX: 23.11 KG/M2 | WEIGHT: 156 LBS | HEART RATE: 86 BPM | OXYGEN SATURATION: 98 %

## 2024-11-08 DIAGNOSIS — Z00.00 PE (PHYSICAL EXAM), ANNUAL: Primary | ICD-10-CM

## 2024-11-08 PROCEDURE — 90471 IMMUNIZATION ADMIN: CPT | Performed by: INTERNAL MEDICINE

## 2024-11-08 PROCEDURE — 99396 PREV VISIT EST AGE 40-64: CPT | Performed by: INTERNAL MEDICINE

## 2024-11-08 PROCEDURE — 90715 TDAP VACCINE 7 YRS/> IM: CPT | Performed by: INTERNAL MEDICINE

## 2024-11-08 NOTE — PROGRESS NOTES
Chief Complaint   Patient presents with    Physical     Rm 8 SS       HPI:  Here for cpe  Has no complaints, doing well with breathing, no gi symptoms.     Review of Systems   Constitutional: Negative for fever, chills and fatigue. No distress.  HENT: Negative for hearing loss, congestion, sore throat, neck pain and dental problem.    Eyes: Negative for pain and visual disturbance.   Respiratory: Negative for cough, chest tightness, shortness of breath and wheezing.    Cardiovascular: Negative for chest pain, palpitations and leg swelling.   Gastrointestinal: Negative for nausea, vomiting, abdominal pain, diarrhea, blood in stool and abdominal distention.   Genitourinary: Negative for dysuria, hematuria and difficulty urinating.   Musculoskeletal: Negative for myalgias, back pain, joint swelling, arthralgias and gait problem.   Skin: Negative for color change and rash.   Neurological: Negative for dizziness, syncope, weakness, numbness, tingling and headaches.   Hematological: Negative for adenopathy. Does not bruise/bleed easily.   Psychiatric/Behavioral: The patient is not nervous/anxious. No depression.    Patient Active Problem List   Diagnosis    Unspecified vitamin D deficiency    Reflux esophagitis    Disorder of bone and cartilage, unspecified    Ulcerative colitis (HCC)    Other dyspnea and respiratory abnormality    Other specified disorder resulting from impaired renal function    Alpha-1-antitrypsin deficiency (HCC)    Pouchitis (HCC)       Past Medical History:    Acidosis    Acute bronchitis    Acute upper respiratory infections of unspecified site    Alpha-1-antitrypsin deficiency (HCC)    Chronic cough    Food intolerance    Had milk alergy my whole life    Iron deficiency    Laboratory examination ordered as part of a routine general medical examination    Other dyspnea and respiratory abnormality    Other nonspecific abnormal serum enzyme levels    Alkaline Phosphatase Elevated    Preop examination     Prostatitis    Renal tubular acidosis    Sinusitis    Sputum production    Ulcer    Vitamin D deficiency    Wears glasses     Past Surgical History:   Procedure Laterality Date    Colonoscopy  12/06/2011    Colonoscopy  2007    Other surgical history      Ileostomy, created J pouch and connected to ileum                         Sigmoidoscopy,biopsy N/A 12/23/2014    Procedure: FLEXIBLE SIGMOIDOSCOPY W/WO BIOPSY, W/WO POLYPECTOMY;  Surgeon: Juan Antonio Garcia MD;  [facility]: [facility]    Sigmoidoscopy,diagnostic  December 2016    Tonsillectomy       Family History   Problem Relation Age of Onset    Cancer Other         Ovarian, Fam hx    Other (Parkinson's disease) Other         Fam hx     Social History     Socioeconomic History    Marital status:    Tobacco Use    Smoking status: Never    Smokeless tobacco: Never   Vaping Use    Vaping status: Never Used   Substance and Sexual Activity    Alcohol use: Yes     Comment: Socially 2-3 times a month    Drug use: No    Sexual activity: Yes     Partners: Female   Other Topics Concern    Caffeine Concern Yes    Exercise No       Current Outpatient Medications   Medication Sig Dispense Refill    loratadine 10 MG Oral Tab Take 1 tablet (10 mg total) by mouth daily.      fluticasone-umeclidin-vilant (TRELEGY ELLIPTA) 200-62.5-25 MCG/ACT Inhalation Aerosol Powder, Breath Activated Inhale 1 puff into the lungs daily. 1 each 11    EPINEPHrine 0.3 MG/0.3ML Injection Solution Auto-injector 0.3 mL (1 each total).      fluticasone propionate 50 MCG/ACT Nasal Suspension 1 spray by Nasal route in the morning and 1 spray before bedtime.      albuterol sulfate (2.5 MG/3ML) 0.083% Inhalation Nebu Soln Take 3 mL (2.5 mg total) by nebulization every 4 (four) hours as needed for Wheezing. 1 Container 1    IRON, FERROUS GLUCONATE, OR Take  by mouth.      CALCIUM 600 OR Take by mouth 2 (two) times a day.      Cholecalciferol (VITAMIN D) 1000 UNITS Oral Cap Take by  mouth 2 (two) times a day.      DAILY MULTIVITAMIN OR Take  by mouth daily.         Allergies  Allergies[1]    Health Maintenance  Immunizations:  Immunization History   Administered Date(s) Administered    Covid-19 Vaccine Pfizer 30 mcg/0.3 ml 03/01/2021, 03/22/2021    Influenza 10/25/2004    TDAP 09/05/2008, 11/26/2013   Pended Date(s) Pended    TDAP 11/08/2024       Physical Exam  /70   Pulse 86   Temp 97.3 °F (36.3 °C) (Temporal)   Resp 18   Ht 5' 9\" (1.753 m)   Wt 156 lb (70.8 kg)   SpO2 98%   BMI 23.04 kg/m²   Constitutional: Oriented to person, place, and time. No distress.   HEENT:  Normocephalic and atraumatic. Hearing and tympanic membranes normal.  Nose normal. Oropharynx is clear and moist.   Eyes: Conjunctivae and EOM are normal. PERRLA. No scleral icterus.   Neck: Normal range of motion. Neck supple. Normal carotid pulses and no JVD present. No edema present. No mass and no thyromegaly present.   Cardiovascular: Normal rate, regular rhythm and intact distal pulses.  No murmur, rubs or gallops.   Pulmonary/Chest: Effort normal and breath sounds normal. No respiratory distress. No wheezes, rhonchi or rales  Abdominal: Soft. Bowel sounds are normal. Non tender, no masses, no organomegaly or hernias.  Musculoskeletal: Normal range of motion. No edema and no tenderness.  No effusions.  Lymphadenopathy: No cervical adenopathy.   Neurological: Normal reflexes. No cranial nerve deficit or sensory deficit. Normal muscle tone. Coordination normal.   Skin: Skin is warm and dry. No rash noted. No erythema. No pallor.   Psychiatric: Normal mood and affect.     A/P:    Encounter Diagnosis   Name Primary?    PE (physical exam), annual Yes     Wlel male continue meds.   Orders Placed This Encounter   Procedures    TdaP (Adacel, Boostrix) [24433]       Meds & Refills for this Visit:  Requested Prescriptions      No prescriptions requested or ordered in this encounter       Imaging & Consults:  TETANUS,  DIPHTHERIA TOXOIDS AND ACELLULAR PERTUSIS VACCINE (TDAP), >7 YEARS, IM USE      No follow-ups on file.    There are no Patient Instructions on file for this visit.    All questions were answered and the patient understands the plan.   I spent at least 30 minutes face to face with the patient, documenting, reviewing the chart, and coordinating care today.            [1]   Allergies  Allergen Reactions    Bees ANAPHYLAXIS    Food      Whey    Milk     Isoflavones DIARRHEA

## 2025-01-18 ENCOUNTER — NURSE ONLY (OUTPATIENT)
Dept: RESPIRATORY THERAPY | Facility: HOSPITAL | Age: 45
End: 2025-01-18
Attending: INTERNAL MEDICINE
Payer: COMMERCIAL

## 2025-01-18 ENCOUNTER — LAB ENCOUNTER (OUTPATIENT)
Dept: LAB | Facility: HOSPITAL | Age: 45
End: 2025-01-18
Attending: INTERNAL MEDICINE
Payer: COMMERCIAL

## 2025-01-18 DIAGNOSIS — E88.01 ALPHA-1-ANTITRYPSIN DEFICIENCY (HCC): ICD-10-CM

## 2025-01-18 LAB
BASOPHILS # BLD AUTO: 0.1 X10(3) UL (ref 0–0.2)
BASOPHILS NFR BLD AUTO: 1.2 %
EOSINOPHIL # BLD AUTO: 0.13 X10(3) UL (ref 0–0.7)
EOSINOPHIL NFR BLD AUTO: 1.6 %
ERYTHROCYTE [DISTWIDTH] IN BLOOD BY AUTOMATED COUNT: 12.8 %
HCT VFR BLD AUTO: 44.4 %
HGB BLD-MCNC: 14.9 G/DL
IMM GRANULOCYTES # BLD AUTO: 0.04 X10(3) UL (ref 0–1)
IMM GRANULOCYTES NFR BLD: 0.5 %
LYMPHOCYTES # BLD AUTO: 1.72 X10(3) UL (ref 1–4)
LYMPHOCYTES NFR BLD AUTO: 21.4 %
MCH RBC QN AUTO: 31 PG (ref 26–34)
MCHC RBC AUTO-ENTMCNC: 33.6 G/DL (ref 31–37)
MCV RBC AUTO: 92.5 FL
MONOCYTES # BLD AUTO: 0.74 X10(3) UL (ref 0.1–1)
MONOCYTES NFR BLD AUTO: 9.2 %
NEUTROPHILS # BLD AUTO: 5.31 X10 (3) UL (ref 1.5–7.7)
NEUTROPHILS # BLD AUTO: 5.31 X10(3) UL (ref 1.5–7.7)
NEUTROPHILS NFR BLD AUTO: 66.1 %
PLATELET # BLD AUTO: 290 10(3)UL (ref 150–450)
RBC # BLD AUTO: 4.8 X10(6)UL
WBC # BLD AUTO: 8 X10(3) UL (ref 4–11)

## 2025-01-18 PROCEDURE — 82103 ALPHA-1-ANTITRYPSIN TOTAL: CPT

## 2025-01-18 PROCEDURE — 36415 COLL VENOUS BLD VENIPUNCTURE: CPT

## 2025-01-18 PROCEDURE — 82785 ASSAY OF IGE: CPT

## 2025-01-18 PROCEDURE — 85025 COMPLETE CBC W/AUTO DIFF WBC: CPT

## 2025-01-19 LAB — A-1-ANTITRYPSIN: 103 MG/DL

## 2025-01-20 LAB — IGE SERPL-ACNC: 26.1 KU/L (ref 2–214)

## 2025-01-27 ENCOUNTER — OFFICE VISIT (OUTPATIENT)
Facility: CLINIC | Age: 45
End: 2025-01-27
Payer: COMMERCIAL

## 2025-01-27 VITALS
BODY MASS INDEX: 22.22 KG/M2 | WEIGHT: 150 LBS | RESPIRATION RATE: 98 BRPM | SYSTOLIC BLOOD PRESSURE: 118 MMHG | HEIGHT: 69 IN | HEART RATE: 67 BPM | DIASTOLIC BLOOD PRESSURE: 74 MMHG

## 2025-01-27 DIAGNOSIS — J30.9 ALLERGIC RHINITIS, UNSPECIFIED SEASONALITY, UNSPECIFIED TRIGGER: Primary | ICD-10-CM

## 2025-01-27 DIAGNOSIS — J40 BRONCHITIS: ICD-10-CM

## 2025-01-27 DIAGNOSIS — I27.20 PULMONARY HYPERTENSION (HCC): ICD-10-CM

## 2025-01-27 PROCEDURE — 99214 OFFICE O/P EST MOD 30 MIN: CPT | Performed by: INTERNAL MEDICINE

## 2025-01-27 NOTE — PATIENT INSTRUCTIONS
Plan -  continue trelegy daily             -  to get flutter valve              - to get echo -ill put the order in            - continue sinus rinse and claritin             - to get sinus CT scan - call for results             - to see dr huerta             - see me in 6 months                    -                          -    Isabel Arias MD  Pulmonary Medicine  01/27/25

## 2025-01-27 NOTE — PROGRESS NOTES
Pulmonary Consult Note    History of Present Illness:  Catalino Humphreys is a 44 year old male presenting to pulmonary clinic today for alpha one   Doing well- - over past few years   Breathing the same - always with a lot of mucus - worse if not taking meds -   Thinks from chest- light green - darker in  Winter- - coughs constantly - mucus hourly   ? 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 -   No dyspnea -   No cp     In general doing well - - summer is better time - no er/UC   Rare cough -   Hives generalized with bee sting- on Sunday - once prior - no breathing issues with it- benadryl   Breo and spiriva daily - no rescue use at all   Remains on singular -   Remains on allergra and saline - - and flonase productive     7/24  no issues - at all -  Had repeat bee sting 2 months after above and hives all over- no problem breathing though with chest pressure - no lip swelling   No issues with scopes - for August   Breo and spiriva daily - notes difference in 2 days without it-   No rescue   Sinus congestion - saline and claritin- more congested with some foods beer- more sinus   Stopped singular for dreams   2 daYs a week planet fittness- no limitation  Ongoing cough -     1/25- mucus comes and goes - coughing- the same - liquid green- less than in the past - in am darker then lighter-   More in am -   Nasal spray saline - no help from flonase etc  Claritin -   Blowing from nose sl green - less than in am - medium -   Never got flutter -   Trelegy helps - - no rescue use                  Past Medical History:   Past Medical History:    Acidosis    Acute bronchitis    Acute upper respiratory infections of unspecified site    Alpha-1-antitrypsin deficiency (HCC)    Chronic cough    Food  intolerance    Had milk alergy my whole life    Iron deficiency    Laboratory examination ordered as part of a routine general medical examination    Other dyspnea and respiratory abnormality    Other nonspecific abnormal serum enzyme levels    Alkaline Phosphatase Elevated    Preop examination    Prostatitis    Renal tubular acidosis    Sinusitis    Sputum production    Ulcer    Vitamin D deficiency    Wears glasses    hx UC- had scope - - no isues -- jpouch 16 yrs ago -2007  - no meds- one flare total since- - for scope in August - dr jarquin-     No cardiac   RTA as a kid- - off bicarb since age 16   Anemia stable   No clots           Past Surgical History:   Past Surgical History:   Procedure Laterality Date    Colonoscopy  12/06/2011    Colonoscopy  2007    Other surgical history      Ileostomy, created J pouch and connected to ileum                         Sigmoidoscopy,biopsy N/A 12/23/2014    Procedure: FLEXIBLE SIGMOIDOSCOPY W/WO BIOPSY, W/WO POLYPECTOMY;  Surgeon: Juan Antonio Garcia MD;  [place] [facility]    Sigmoidoscopy,diagnostic  December 2016    Tonsillectomy         Family Medical History:   Family History   Problem Relation Age of Onset    Cancer Other         Ovarian, Fam hx    Other (Parkinson's disease) Other         Fam hx       Social History: Social History    Socioeconomic History      Marital status:     Tobacco Use      Smoking status: Never      Smokeless tobacco: Never    Vaping Use      Vaping Use: Never used    Substance and Sexual Activity      Alcohol use: Yes        Alcohol/week: 0.0 standard drinks        Comment: Socially 2-3 times a month      Drug use: No      Sexual activity: Yes        Partners: Female    Other Topics      Concerns:        Caffeine Concern: Yes        Exercise: No  IT at [employer]     Allergies: Bees, Food, Milk, and Isoflavones     Medications:   Current Outpatient Medications   Medication Sig Dispense Refill    loratadine 10  MG Oral Tab Take 1 tablet (10 mg total) by mouth daily.      fluticasone-umeclidin-vilant (TRELEGY ELLIPTA) 200-62.5-25 MCG/ACT Inhalation Aerosol Powder, Breath Activated Inhale 1 puff into the lungs daily. 1 each 11    EPINEPHrine 0.3 MG/0.3ML Injection Solution Auto-injector 0.3 mL (1 each total).      fluticasone propionate 50 MCG/ACT Nasal Suspension 1 spray by Nasal route in the morning and 1 spray before bedtime.      albuterol sulfate (2.5 MG/3ML) 0.083% Inhalation Nebu Soln Take 3 mL (2.5 mg total) by nebulization every 4 (four) hours as needed for Wheezing. 1 Container 1    IRON, FERROUS GLUCONATE, OR Take  by mouth.      CALCIUM 600 OR Take by mouth 2 (two) times a day.      Cholecalciferol (VITAMIN D) 1000 UNITS Oral Cap Take by mouth 2 (two) times a day.      DAILY MULTIVITAMIN OR Take  by mouth daily.         Review of Systems: weight is sl up - 10# in last few years - up 0ne #   Eating well -   No gerd - rare - 2 times a year- takes tums   No skin lesions no hives   No swelling   Sleeping - no issues - no snroing - rested - mostly - dreams with singular now off   Yearly scopes with dr jarquin       Physical Exam:  /74   Pulse 67   Resp (!) 98   Ht 5' 9\" (1.753 m)   Wt 150 lb (68 kg)   BMI 22.15 kg/m²    Constitutional: comfortable . No acute distress.   HEENT: Head NC/AT. PEERL. Throat is clear congested nose red and swollen   Cardio: Regular rate and rhythm. Normal S1 and S2. No murmurs.   Respiratory: Thorax symmetrical with no labored breathing. Clear to ausculation bilaterally with symmetrical breath sounds. No wheezing, rhonchi, rales, or crackles. Clear   GI:  Abd soft, non-tender.  Extremities: No clubbing or cyanosis. No LE edema. No calf tenderness.  Neurologic: A&Ox3. No gross motor deficits.  Skin: Warm, dry.no rashes or hives noted   Lymphatic: No cervical or supraclavicular lymphadenopathy.no jvd   Psych: Calm, cooperative. Pleasant affect.    Results:  Reviewed      Assessment/Plan:  #Alpha-1 antitrypsin deficiency  MZ last level 77 in 2015  Serial PFTs remain without change or improved from 2015 with plans to continue to follow  7/23 remains without symptoms  Repeat CT with minimal cylindrical bronchiectasis at most seems unchanged from prior films no evidence of flare or inflammation no mucus  7/24- remains without sx /limitation - notes significant increase in mucus production-productive of green-if misses 2 days of MDIs--plan to reassess  1/25- no limitation -remains active      #History of ulcerative colitis  Remains nearly symptom-free status post J-pouch - 17 yrs ago -   Follows with  Dr. Haywood  7/24- due for scopes again-  1/25- no new issues - had scopes - lower scope all good - yearly -       #Component bronchiectasis clinical asthma  Remains minimal without mucus   As above some green drainage suspect postnasal  To start flutter valve given the volume he describes-plans for repeating PFTs  1/25- to get flutter valve   Repeat PFTs essentially unchanged from 2015 slightly lowered DLCO trying to get echo    #Productive cough suspect multifactorial  Unclear if component bronchiectasis-clear lungs  Suspect component significant allergic rhinitis-plans to increase sinus medications and recheck CT  7/23 suspect ongoing allergic rhinitis with plans to continue Flonase, antihistamine and saline  CT without demonstrable cause of cough  7/24- remains with sinus congestion and ongoing meds - notes if not taking MDIs  could cough up 24oz of green -plan to follow and flutter valve/ongoing sinus medication--assess for possible benefit of biologic  1/25- remains - saline helps  the most   Mucus triples if doesn't take the trelegy -   Had sinus surgery - with Dr Cho 10 years ago -   Started with broken nose - while playing soccer-- straightened but mucus since then  PND_ -- drips more - 2 yrs later coughing from chest - -believes coughing is from his chest not his sinuses  PFTs  essentially unchanged from 2015  -     Plan -  continue trelegy daily             -  to get flutter valve              - to get echo -           - continue sinus rinse and claritin             - to get sinus CT scan - call for results             - to see dr huerta              - to get sputum testing             - see me in 6 months                    -                          -    Isabel Arias MD  Pulmonary Medicine  01/27/25

## 2025-01-28 ENCOUNTER — TELEPHONE (OUTPATIENT)
Facility: CLINIC | Age: 45
End: 2025-01-28

## 2025-01-28 DIAGNOSIS — J47.9 BRONCHIECTASIS WITHOUT COMPLICATION (HCC): Primary | ICD-10-CM

## 2025-01-28 NOTE — TELEPHONE ENCOUNTER
Per Dr. Arias: \"Would be possible to get this gentleman a flutter valve please.\"   Order entered via [employer], sent to [facility]. [facility] representative Flakita, notified.

## 2025-01-29 PROCEDURE — 94726 PLETHYSMOGRAPHY LUNG VOLUMES: CPT | Performed by: INTERNAL MEDICINE

## 2025-01-29 PROCEDURE — 94010 BREATHING CAPACITY TEST: CPT | Performed by: INTERNAL MEDICINE

## 2025-01-29 PROCEDURE — 94729 DIFFUSING CAPACITY: CPT | Performed by: INTERNAL MEDICINE

## 2025-01-29 NOTE — PROCEDURES
Pulmonary Function Test Report  Findings:  Prebronchodilator FEV1 is 4.43L, z-score 1.03.  Prebronchodilator FVC is 5.62L, z-score 1.17.                           FEV1/ FVC ratio is 79 %, z-score -0.26.   The flow-volume loop demonstrates a normal pattern.  The TLC is 7.70L, z-score 0.60. The residual volume 2.08L, z-score 0.74.   The diffusion capacity is 17.76, z-score -3.24 and -3.24 when corrected for alveolar volume.     Impression:  Spirometry is normal.  Lung volumes are normal.     Diffusion capacity is moderate z-score (-2.51 to -4.0).     This reduced diffusion capacity is out of proportion to the observed airway obstruction and  the isolated decrease in diffusion capacity  can be seen in emphysema, interstitial lung disease, pulmonary vascular disease (such as pulmonary hypertension) and anemia. If not already performed, would suggest further evaluation as determined clinically.   When compared to PFTs from 7/14/22, there is an improvement in FEV1 from 4.17 to 4.43 and FVC from 5.21 to 5.62L.TLC decreased from 8.1 to 7.70L and DLCO from 21.3 to 17.7  Disclaimer: This PFT has been interpreted based on Z-score/LLN/ULN criteria as described in ATS guidelines 2022.   Jayne Castro MD  [facility] Pulmonary Medicine  Office: [phone]

## 2025-01-30 ENCOUNTER — LAB ENCOUNTER (OUTPATIENT)
Dept: LAB | Facility: HOSPITAL | Age: 45
End: 2025-01-30
Attending: INTERNAL MEDICINE
Payer: COMMERCIAL

## 2025-01-30 DIAGNOSIS — J40 BRONCHITIS: ICD-10-CM

## 2025-01-30 PROCEDURE — 87102 FUNGUS ISOLATION CULTURE: CPT

## 2025-01-30 PROCEDURE — 87206 SMEAR FLUORESCENT/ACID STAI: CPT

## 2025-01-30 PROCEDURE — 87116 MYCOBACTERIA CULTURE: CPT

## 2025-01-30 PROCEDURE — 87077 CULTURE AEROBIC IDENTIFY: CPT

## 2025-01-30 PROCEDURE — 87205 SMEAR GRAM STAIN: CPT

## 2025-01-30 PROCEDURE — 87070 CULTURE OTHR SPECIMN AEROBIC: CPT

## 2025-01-31 ENCOUNTER — TELEPHONE (OUTPATIENT)
Facility: CLINIC | Age: 45
End: 2025-01-31

## 2025-01-31 NOTE — TELEPHONE ENCOUNTER
Phone call to patient--left message  Sputum results with +3 WBCs  Normal respiratory sly  As well as Moraxella    Plan for 10-day trial of Augmentin  To call if questions or concerns

## 2025-02-19 ENCOUNTER — OFFICE VISIT (OUTPATIENT)
Facility: CLINIC | Age: 45
End: 2025-02-19
Payer: COMMERCIAL

## 2025-02-19 VITALS
RESPIRATION RATE: 18 BRPM | DIASTOLIC BLOOD PRESSURE: 80 MMHG | HEART RATE: 57 BPM | OXYGEN SATURATION: 99 % | SYSTOLIC BLOOD PRESSURE: 110 MMHG

## 2025-02-19 DIAGNOSIS — J32.9 CHRONIC SINUSITIS, UNSPECIFIED LOCATION: Primary | ICD-10-CM

## 2025-02-19 DIAGNOSIS — R09.82 PND (POST-NASAL DRIP): ICD-10-CM

## 2025-02-19 DIAGNOSIS — K21.9 GASTROESOPHAGEAL REFLUX DISEASE WITHOUT ESOPHAGITIS: ICD-10-CM

## 2025-02-19 DIAGNOSIS — J30.89 NON-SEASONAL ALLERGIC RHINITIS, UNSPECIFIED TRIGGER: ICD-10-CM

## 2025-02-19 DIAGNOSIS — R05.3 CHRONIC COUGH: ICD-10-CM

## 2025-02-19 NOTE — PATIENT INSTRUCTIONS
Patient will schedule allergy skin test to aeroallergens-adult profile-[place] region at my [place] [facility] clinic in [facility]: [address], [place], [address]: [phone].  Patient will stop all allergy medication more than 5 days prior to the allergy skin test.    We will review results of the allergy skin test upon completion; and, hopefully review the results of his upcoming CT scan of the sinuses.

## 2025-02-19 NOTE — H&P
Catalino Humphreys is a 44 year old male.    HPI:     Chief Complaint   Patient presents with    New Patient     Referred by Dr. Arias. Patient states he has had increased sputum/ nasal congestion and would like to see if it is allergy related.     Patient is a 44-year-old male referred by [facility]ologist, Dr. Elicia Arias for new consult for evaluation for chronic rhinosinusitis/postnasal drainage/cough    Patient reports a problem with his sinuses for more than 10 years.  He required 2 sinus surgeries per Dr. Cho -[facility] sinus specialist  Patient has not revisited Dr. Cho for the past 5 years  Recently, Dr. Arias has ordered a CT scan of the sinuses-to be done later this week.    Patient has lived in a 20-year-old house for 20 years.  The house has forced air heat and central air conditioning.  The house has a dry basement.    Patient sleeps on a mattress and spring of unknown age.  He has down pillows and comforters.  His bedroom has a 19-year-old carpet.  Patient's sits on an 8-month-old leather furniture.    There are no smokers.  There are no pets.    Patient works in IT at [employer].  He has been working in his current office for the past 8 years.  The office building is over 100 years old and is described as marcela.  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  Some of the buildings are quite marcela.    Patient enjoys outdoor activities with nice weather including soccer and biking.        HISTORY:  Past Medical History:    Acidosis    Acute bronchitis    Acute upper respiratory infections of unspecified site    Alpha-1-antitrypsin deficiency (HCC)    Chronic cough    Food intolerance    Had milk alergy my whole life    Iron deficiency    Laboratory examination ordered as part of a routine general medical examination    Other dyspnea and respiratory abnormality    Other nonspecific abnormal serum enzyme  levels    Alkaline Phosphatase Elevated    Preop examination    Prostatitis    Renal tubular acidosis    Sinusitis    Sputum production    Ulcer    Vitamin D deficiency    Wears glasses      Past Surgical History:   Procedure Laterality Date    Colonoscopy  12/06/2011    Colonoscopy  2007    Other surgical history      Ileostomy, created J pouch and connected to ileum                         Sigmoidoscopy,biopsy N/A 12/23/2014    Procedure: FLEXIBLE SIGMOIDOSCOPY W/WO BIOPSY, W/WO POLYPECTOMY;  Surgeon: Juan Antonio Garcia MD;  [facility]    Sigmoidoscopy,diagnostic  December 2016    Tonsillectomy        Family History   Problem Relation Age of Onset    Cancer Other         Ovarian, Fam hx    Other (Parkinson's disease) Other         Fam hx      Social History:   Social History     Socioeconomic History    Marital status:    Tobacco Use    Smoking status: Never     Passive exposure: Never    Smokeless tobacco: Never   Vaping Use    Vaping status: Never Used   Substance and Sexual Activity    Alcohol use: Yes     Comment: Socially 2-3 times a month    Drug use: No    Sexual activity: Yes     Partners: Female   Other Topics Concern    Caffeine Concern Yes    Exercise No        Medications (Active prior to today's visit):  Current Outpatient Medications   Medication Sig Dispense Refill    loratadine 10 MG Oral Tab Take 1 tablet (10 mg total) by mouth daily.      fluticasone-umeclidin-vilant (TRELEGY ELLIPTA) 200-62.5-25 MCG/ACT Inhalation Aerosol Powder, Breath Activated Inhale 1 puff into the lungs daily. 1 each 11    EPINEPHrine 0.3 MG/0.3ML Injection Solution Auto-injector 0.3 mL (1 each total).      albuterol sulfate (2.5 MG/3ML) 0.083% Inhalation Nebu Soln Take 3 mL (2.5 mg total) by nebulization every 4 (four) hours as needed for Wheezing. 1 Container 1    IRON, FERROUS GLUCONATE, OR Take  by mouth.      CALCIUM 600 OR Take by mouth 2 (two) times a day.      Cholecalciferol (VITAMIN  D) 1000 UNITS Oral Cap Take by mouth 2 (two) times a day.      DAILY MULTIVITAMIN OR Take  by mouth daily.      amoxicillin clavulanate 875-125 MG Oral Tab Take 1 tablet by mouth 2 (two) times daily. (Patient not taking: Reported on 2/19/2025) 20 tablet 0    fluticasone propionate 50 MCG/ACT Nasal Suspension 1 spray by Nasal route in the morning and 1 spray before bedtime. (Patient not taking: Reported on 2/19/2025)         Allergies:  Allergies[1]      ROS:   Review of Systems   Constitutional:  Negative for activity change, appetite change, chills, diaphoresis, fatigue, fever and unexpected weight change.   HENT:  Positive for congestion, postnasal drip and sinus pressure. Negative for ear discharge, ear pain, facial swelling, hearing loss, mouth sores, rhinorrhea, sinus pain, sneezing, sore throat, tinnitus, trouble swallowing and voice change.    Eyes:  Negative for pain, discharge, redness and itching.   Respiratory:  Positive for cough. Negative for apnea, choking, chest tightness, shortness of breath, wheezing and stridor.    Cardiovascular:  Negative for chest pain and palpitations.   Gastrointestinal:  Negative for abdominal distention, abdominal pain, constipation, diarrhea, nausea and vomiting.   Endocrine: Negative for cold intolerance and heat intolerance.   Musculoskeletal:  Negative for arthralgias, joint swelling and myalgias.   Skin:  Negative for pallor and rash.   Allergic/Immunologic: Positive for environmental allergies. Negative for food allergies and immunocompromised state.   Neurological:  Negative for headaches.   Psychiatric/Behavioral:  Negative for behavioral problems and sleep disturbance.           PHYSICAL EXAM:   Physical Exam  Constitutional:       Appearance: He is normal weight.   HENT:      Head: No right periorbital erythema or left periorbital erythema.      Right Ear: Tympanic membrane normal. There is no impacted cerumen.      Left Ear: Tympanic membrane normal. There is no  impacted cerumen.      Nose: No congestion or rhinorrhea.      Right Turbinates: Swollen and pale.      Left Turbinates: Swollen and pale.      Mouth/Throat:      Pharynx: No oropharyngeal exudate or posterior oropharyngeal erythema.   Eyes:      General: Lids are normal. No allergic shiner.        Right eye: No discharge.         Left eye: No discharge.      Conjunctiva/sclera: Conjunctivae normal.      Right eye: Right conjunctiva is not injected. No exudate.     Left eye: Left conjunctiva is not injected. No exudate.  Cardiovascular:      Rate and Rhythm: Normal rate and regular rhythm.      Heart sounds: Normal heart sounds.   Pulmonary:      Effort: No tachypnea, prolonged expiration or respiratory distress.      Breath sounds: No stridor or decreased air movement. No decreased breath sounds, wheezing, rhonchi or rales.   Skin:     Coloration: Skin is not cyanotic or pale.      Findings: No acne, ecchymosis, erythema, petechiae or rash. Rash is not macular, nodular, papular, purpuric, pustular, scaling, urticarial or vesicular.           ASSESSMENT   Assessment   Encounter Diagnoses   Name Primary?    Chronic sinusitis, unspecified location Yes    PND (post-nasal drip)     Chronic cough     Gastroesophageal reflux disease without esophagitis     Non-seasonal allergic rhinitis, unspecified trigger        PLAN   Patient will schedule allergy skin test to aeroallergens-adult profile-[place] region at our [place] allergy clinic in [facility].  Patient will stop all allergy medications at least 5 days prior to the allergy skin test.    We will review the results of the allergy skin test upon completion; and, hopefully review the results of his upcoming CT scan of the sinuses         Orders This Visit:  No orders of the defined types were placed in this encounter.      Meds This Visit:  Requested Prescriptions      No prescriptions requested or ordered in this encounter       Imaging & Referrals:  None      2/19/2025  Heriberto Hester MD      If medication samples were provided today, they were provided solely for patient education and training related to self administration of these medications.  Teaching, instruction and sample was provided to the patient by myself.  Teaching included  a review of potential adverse side effects as well as potential efficacy.  Patient's questions were answered in regards to medication administration and dosing and potential side effects. Teaching was provided via the teach back method       [1]   Allergies  Allergen Reactions    Bees ANAPHYLAXIS    Food      Whey    Milk     Isoflavones DIARRHEA

## 2025-02-20 ENCOUNTER — TELEPHONE (OUTPATIENT)
Dept: ALLERGY | Facility: CLINIC | Age: 45
End: 2025-02-20

## 2025-02-20 NOTE — TELEPHONE ENCOUNTER
Patient wants to schedule an allergy scratch test. He will be available to call/have access to [employer] starting 2/21/25 because he's flying today.

## 2025-02-21 NOTE — TELEPHONE ENCOUNTER
RN called pt and left a voicemail.    When pt returns call, a Consult may be scheduled for a skin testing.     Provided call back number to schedule.

## 2025-02-26 ENCOUNTER — HOSPITAL ENCOUNTER (OUTPATIENT)
Dept: CT IMAGING | Facility: HOSPITAL | Age: 45
Discharge: HOME | End: 2025-02-26
Attending: INTERNAL MEDICINE
Payer: COMMERCIAL

## 2025-02-26 ENCOUNTER — HOSPITAL ENCOUNTER (OUTPATIENT)
Dept: CV DIAGNOSTICS | Facility: HOSPITAL | Age: 45
Discharge: HOME | End: 2025-02-26
Attending: INTERNAL MEDICINE
Payer: COMMERCIAL

## 2025-02-26 DIAGNOSIS — I27.20 PULMONARY HYPERTENSION (HCC): ICD-10-CM

## 2025-02-26 DIAGNOSIS — J30.9 ALLERGIC RHINITIS, UNSPECIFIED SEASONALITY, UNSPECIFIED TRIGGER: ICD-10-CM

## 2025-02-26 PROCEDURE — 70486 CT MAXILLOFACIAL W/O DYE: CPT | Performed by: INTERNAL MEDICINE

## 2025-02-26 PROCEDURE — 93306 TTE W/DOPPLER COMPLETE: CPT | Performed by: INTERNAL MEDICINE

## 2025-04-15 ENCOUNTER — NURSE ONLY (OUTPATIENT)
Dept: ALLERGY | Facility: CLINIC | Age: 45
End: 2025-04-15

## 2025-04-15 ENCOUNTER — OFFICE VISIT (OUTPATIENT)
Dept: ALLERGY | Facility: CLINIC | Age: 45
End: 2025-04-15
Payer: COMMERCIAL

## 2025-04-15 DIAGNOSIS — J32.0 CHRONIC MAXILLARY SINUSITIS: Primary | ICD-10-CM

## 2025-04-15 DIAGNOSIS — E88.01 ALPHA-1-ANTITRYPSIN DEFICIENCY (HCC): ICD-10-CM

## 2025-04-15 DIAGNOSIS — K51.90 ULCERATIVE COLITIS WITHOUT COMPLICATIONS, UNSPECIFIED LOCATION (HCC): ICD-10-CM

## 2025-04-15 DIAGNOSIS — R09.82 PND (POST-NASAL DRIP): ICD-10-CM

## 2025-04-15 DIAGNOSIS — J32.1 CHRONIC FRONTAL SINUSITIS: ICD-10-CM

## 2025-04-15 DIAGNOSIS — R05.3 CHRONIC COUGH: ICD-10-CM

## 2025-04-15 DIAGNOSIS — J30.81 ALLERGIC RHINITIS DUE TO ANIMAL HAIR AND DANDER: ICD-10-CM

## 2025-04-15 DIAGNOSIS — J30.81 ALLERGIC RHINITIS DUE TO ANIMAL (CAT) (DOG) HAIR AND DANDER: Primary | ICD-10-CM

## 2025-04-15 PROCEDURE — 95004 PERQ TESTS W/ALRGNC XTRCS: CPT | Performed by: ALLERGY & IMMUNOLOGY

## 2025-04-15 PROCEDURE — 99213 OFFICE O/P EST LOW 20 MIN: CPT | Performed by: ALLERGY & IMMUNOLOGY

## 2025-04-15 NOTE — PROGRESS NOTES
Catalino Humphreys is a 44 year old male.    HPI:     Chief Complaint   Patient presents with    Follow - Up     Patient here for a follow up to get skin allergy testing. No antihistamines taken in the last 5 days.     Patient is a 44-year-old male for allergy skin test to aeroallergens-adult profile-[place].    Patient has a history of alpha-1 antitrypsin deficiency; but initially presented to our clinic with complaints of chronic rhinosinusitis/postnasal drainage/chronic cough.  He has had 2 previous sinus procedures per [facility] sinus specialist Dr. Cho.    Today, we reviewed the results of the patient's February 26, 2025 CT scan of the sinuses, which revealed bilateral maxillary mucosal thickening, including bilateral ostiomeatal complex narrowing.  In addition patient CT scan of sinuses revealed significant right frontal sinus opacification.        HISTORY:  Past Medical History[1]   Past Surgical History[2]   Family History[3]   Social History: Short Social Hx on File[4]     Medications (Active prior to today's visit):  Current Medications[5]    Allergies:  Allergies[6]      ROS:   Review of Systems   Constitutional:  Negative for activity change, appetite change, chills, diaphoresis, fatigue, fever and unexpected weight change.   HENT:  Positive for congestion, postnasal drip and sinus pressure. Negative for ear discharge, ear pain, facial swelling, hearing loss, mouth sores, rhinorrhea, sinus pain, sneezing, sore throat, tinnitus, trouble swallowing and voice change.    Eyes:  Negative for pain, discharge, redness and itching.   Respiratory:  Positive for cough. Negative for apnea, choking, chest tightness, shortness of breath, wheezing and stridor.    Cardiovascular:  Negative for chest pain and palpitations.   Gastrointestinal:  Negative for abdominal distention, abdominal pain, constipation, diarrhea, nausea and vomiting.   Endocrine: Negative for cold intolerance and heat intolerance.    Musculoskeletal:  Negative for arthralgias, joint swelling and myalgias.   Skin:  Negative for pallor and rash.   Allergic/Immunologic: Positive for environmental allergies. Negative for food allergies and immunocompromised state.   Neurological:  Negative for headaches.   Psychiatric/Behavioral:  Negative for behavioral problems and sleep disturbance.           PHYSICAL EXAM:   Physical Exam  Constitutional:       Appearance: He is normal weight.   HENT:      Head: No right periorbital erythema or left periorbital erythema.      Right Ear: Tympanic membrane normal. There is no impacted cerumen.      Left Ear: Tympanic membrane normal. There is no impacted cerumen.      Nose: No congestion or rhinorrhea.      Mouth/Throat:      Pharynx: No oropharyngeal exudate or posterior oropharyngeal erythema.   Eyes:      General: Lids are normal. No allergic shiner.        Right eye: No discharge.         Left eye: No discharge.      Conjunctiva/sclera: Conjunctivae normal.      Right eye: Right conjunctiva is not injected. No exudate.     Left eye: Left conjunctiva is not injected. No exudate.  Cardiovascular:      Rate and Rhythm: Normal rate and regular rhythm.      Heart sounds: Normal heart sounds.   Pulmonary:      Effort: No tachypnea, prolonged expiration or respiratory distress.      Breath sounds: No stridor or decreased air movement. No decreased breath sounds, wheezing, rhonchi or rales.   Skin:     Coloration: Skin is not cyanotic or pale.      Findings: No acne, ecchymosis, erythema, petechiae or rash. Rash is not macular, nodular, papular, purpuric, pustular, scaling, urticarial or vesicular.           ASSESSMENT   Assessment   Encounter Diagnoses   Name Primary?    Chronic maxillary sinusitis Yes    PND (post-nasal drip)     Chronic cough     Alpha-1-antitrypsin deficiency (HCC)     Ulcerative colitis without complications, unspecified location (HCC)     Chronic frontal sinusitis     Allergic rhinitis due to  animal hair and dander        PLAN   Today, we performed allergy skin test to aeroallergens-adult profile-[place]; prick test were only positive to cat dander-patient does not have a cat.    Recommend patient revisit Dr. Cho, ENT sinus specialist for review of results of patient's recent CT scan of the sinuses    We can order blood/RAST testing to aeroallergens if desired.         Orders This Visit:  No orders of the defined types were placed in this encounter.      Meds This Visit:  Requested Prescriptions      No prescriptions requested or ordered in this encounter       Imaging & Referrals:  None     4/15/2025  Heriberto Hester MD      If medication samples were provided today, they were provided solely for patient education and training related to self administration of these medications.  Teaching, instruction and sample was provided to the patient by myself.  Teaching included  a review of potential adverse side effects as well as potential efficacy.  Patient's questions were answered in regards to medication administration and dosing and potential side effects. Teaching was provided via the teach back method         [1]   Past Medical History:   Acidosis    Acute bronchitis    Acute upper respiratory infections of unspecified site    Alpha-1-antitrypsin deficiency (HCC)    Chronic cough    Food intolerance    Had milk alergy my whole life    Iron deficiency    Laboratory examination ordered as part of a routine general medical examination    Other dyspnea and respiratory abnormality    Other nonspecific abnormal serum enzyme levels    Alkaline Phosphatase Elevated    Preop examination    Prostatitis    Renal tubular acidosis    Sinusitis    Sputum production    Ulcer    Vitamin D deficiency    Wears glasses   [2]   Past Surgical History:  Procedure Laterality Date    Colonoscopy  12/06/2011    Colonoscopy  2007    Other surgical history      Ileostomy, created J pouch and connected to ileum                          Sigmoidoscopy,biopsy N/A 12/23/2014    Procedure: FLEXIBLE SIGMOIDOSCOPY W/WO BIOPSY, W/WO POLYPECTOMY;  Surgeon: Juan Antonio Garcia MD;  [facility] [facility]    Sigmoidoscopy,diagnostic  December 2016    Tonsillectomy     [3]   Family History  Problem Relation Age of Onset    Cancer Other         Ovarian, Fam hx    Other (Parkinson's disease) Other         Fam hx   [4]   Social History  Socioeconomic History    Marital status:    Tobacco Use    Smoking status: Never     Passive exposure: Never    Smokeless tobacco: Never   Vaping Use    Vaping status: Never Used   Substance and Sexual Activity    Alcohol use: Yes     Comment: Socially 2-3 times a month    Drug use: No    Sexual activity: Yes     Partners: Female   Other Topics Concern    Caffeine Concern Yes    Exercise No   [5]   Current Outpatient Medications   Medication Sig Dispense Refill    fluticasone-umeclidin-vilant (TRELEGY ELLIPTA) 200-62.5-25 MCG/ACT Inhalation Aerosol Powder, Breath Activated Inhale 1 puff into the lungs daily. 1 each 11    EPINEPHrine 0.3 MG/0.3ML Injection Solution Auto-injector 0.3 mL (1 each total).      albuterol sulfate (2.5 MG/3ML) 0.083% Inhalation Nebu Soln Take 3 mL (2.5 mg total) by nebulization every 4 (four) hours as needed for Wheezing. 1 Container 1    IRON, FERROUS GLUCONATE, OR Take  by mouth.      CALCIUM 600 OR Take by mouth 2 (two) times a day.      Cholecalciferol (VITAMIN D) 1000 UNITS Oral Cap Take by mouth 2 (two) times a day.      DAILY MULTIVITAMIN OR Take  by mouth daily.      amoxicillin clavulanate 875-125 MG Oral Tab Take 1 tablet by mouth 2 (two) times daily. (Patient not taking: Reported on 4/15/2025) 20 tablet 0    loratadine 10 MG Oral Tab Take 1 tablet (10 mg total) by mouth daily. (Patient not taking: Reported on 4/15/2025)      fluticasone propionate 50 MCG/ACT Nasal Suspension 1 spray by Nasal route in the morning and 1 spray before bedtime. (Patient not taking:  Reported on 4/15/2025)     [6]   Allergies  Allergen Reactions    Bees ANAPHYLAXIS    Food      Whey    Milk     Isoflavones DIARRHEA

## 2025-05-12 ENCOUNTER — OFFICE VISIT (OUTPATIENT)
Facility: LOCATION | Age: 45
End: 2025-05-12
Payer: COMMERCIAL

## 2025-05-12 DIAGNOSIS — J32.4 CHRONIC PANSINUSITIS: Primary | ICD-10-CM

## 2025-05-12 DIAGNOSIS — R09.82 POSTNASAL DRIP: ICD-10-CM

## 2025-05-12 DIAGNOSIS — R43.8 HYPOSMIA: ICD-10-CM

## 2025-05-12 PROCEDURE — 99204 OFFICE O/P NEW MOD 45 MIN: CPT | Performed by: OTOLARYNGOLOGY

## 2025-05-12 PROCEDURE — 31231 NASAL ENDOSCOPY DX: CPT | Performed by: OTOLARYNGOLOGY

## 2025-05-12 RX ORDER — FLUTICASONE PROPIONATE 93 UG/1
1 SPRAY, METERED NASAL 2 TIMES DAILY
Qty: 1 EACH | Refills: 3 | Status: SHIPPED | OUTPATIENT
Start: 2025-05-12

## 2025-05-12 NOTE — PROGRESS NOTES
Otolaryngology Consultation Note     Reason for consultation: sinusitis  Consulting physician and service: Adam Schriedel, MD      HPI: 43 y/o M presents with CRS s/p septoplasty x2 by Dr. Cho in the past. Uncertain if he underwent ESS at the same time. Cont to have increased phlegm production. Saw Allergist, underwent allergy testing which was negative per patient. Sees Pulmonology for alpha-1 antitrypsin deficiency. Recently underwent CT which showed concern for sinusitis. Positive nasal congestion and postnasal drainage. No h/o recurrent sinusitis. No facial pain/pressure. No fever/chills. Positive hyposmia, chronic, no dysgeusia. Has used Flonase in the past but discontinued over a year ago. No other complaints.      Past Medical History: Past Medical History[1]     Past Surgical History: Past Surgical History[2]     Medication: Scheduled Meds:Scheduled Medications[3]  Continuous Infusions:Medication Infusions[4]  PRN Meds:.PRN Medications[5]     Allergies:  Allergies[6]  Pertinent Family History: Family History[7]     Pertinent Social History:   Social History     Socioeconomic History    Marital status:      Spouse name: Not on file    Number of children: Not on file    Years of education: Not on file    Highest education level: Not on file   Occupational History    Not on file   Tobacco Use    Smoking status: Never     Passive exposure: Never    Smokeless tobacco: Never   Vaping Use    Vaping status: Never Used   Substance and Sexual Activity    Alcohol use: Yes     Comment: Socially 2-3 times a month    Drug use: No    Sexual activity: Yes     Partners: Female   Other Topics Concern     Service Not Asked    Blood Transfusions Not Asked    Caffeine Concern Yes    Occupational Exposure Not Asked    Hobby Hazards Not Asked    Sleep Concern Not Asked    Stress Concern Not Asked    Weight Concern Not Asked    Special Diet Not Asked    Back Care Not Asked    Exercise No    Bike Helmet Not Asked     Seat Belt Not Asked    Self-Exams Not Asked   Social History Narrative    Not on file     Social Drivers of Health     Food Insecurity: Not on file   Transportation Needs: Not on file   Stress: Not on file   Housing Stability: Not on file        Review of Systems:  Constitutional: Negative.  HENT: see above  Eyes: Negative.  Respiratory: Negative.  Cardiovascular: Negative.  Gastrointestinal: Negative.  Musculoskeletal: Negative.  Skin: Negative.  Renal: Negative  Endocrine: Negative  Psychiatric/Behavioral: Negative.     Physical Examination:  Vitals: There were no vitals taken for this visit.     General: Breathing comfortably on room air while sitting up. Able to communicate verbally. Voice normal. Normal appearing body habitus.     Musculoskeletal: Head: Atraumatic and normocephalic.     Neck: Full ROM and able to extend without issues     Ears: External auditory canals clear with no evidence of significant cerumen or stenosis. Tympanic membranes visible with no evidence of retraction or perforation. No evidence of middle ear effusion bilaterally.     Nose: No sinus tenderness bilaterally upon palpation. No obvious nasal deformity. No masses, rhinorrhea, epistaxis. Nasal septum, mucosa, and turbinates appear normal.     Mouth/Throat: Salivary glands appear normal with no evidence of pain or mass. No masses or lesions noted within the oral mucosa, hard and soft palates, tongue, tonsils and posterior pharynx. Able to tolerate secretions. Oral cavity and oropharynx widely patent. Tonsils 1 plus and symmetric. Posterior pharyngeal walls appear normal. Thyroid non tender to palpation without evidence of mass or nodules.     Eyes: Extraocular movements intact and pupils equally reactive to light stimulus. No spontaneous or gaze-evoked nystagmus. No proptosis or ecchymosis. VFI.     Lymphatic: No significant cervical lymphadenopathy noted.     Neuro: CN 7 intact with symmetric mobility and strength. No loss of facial  sensation.     Skin: Dry, normal turgor, normal color.     Psych: Alert and oriented to person/place/time. Normal affect, amiable     Significant laboratory values: n/a     Imaging:   CT sinus personally reviewed    FINDINGS:    NASAL SEPTUM:  No significant nasal septal deviation.  TURBINATES:  There a shin of the vertical plate of the middle turbinates bilaterally..  UNCINATE PROCESSES:  Demineralization of the uncinate processes bilaterally.    OMU:  Narrowing of the maxillary outflow tracts bilaterally secondary to lobulated mucosal thickening with opacification on the left and partial opacification on the right.  There are low lying anterior ethmoidal bulla bilaterally.  Small aerated  right-sided Troy cell.  SINUSES:  Lobulated mucosal thickening involving both maxillary sinuses left greater than right.  Partial opacification of the right frontal sinus and posterior middle ethmoid air cells bilaterally.  Lobulated mucosal thickening involving the left  frontal sinus and sphenoid sinus.  FOVEA ETHMOIDALIS:  The fovea ethmoidalis is intact. The cribriform plate is mildly low lying.             Impression   CONCLUSION:  Pan sinusitis with mucosal thickening and air-fluid levels in the maxillary sinuses and right frontal sinus..        Procedures:   Otolaryngology Procedure     Patient name: Catalino Humphreys     YOB: 1952     Procedure: Flexible Fiberoptic Nasal Endoscopy, Laryngoscopy     Indication: chronic sinusitis     Anesthesia: Topical 1% lidocaine with oxymetazoline     Surgeon: Sulma Mckeon MD     Procedure detail: Benefits and risks discussed with patient, which include bleeding, pain, infection, damage to surrounding structures. Agreement obtained from patient. Patient, while sitting up, was anesthetized with topical anesthetic. This was allowed to sit for 5 minutes. A lubricated flexible fiberoptic endoscopy was inserted through the nasal cavity to the level of the nasopharynx  with findings as follows: No masses or lesions in nasal cavities; bilateral inferior, middle and superior turbinates intact; bilateral inferior, middle and superior meati clear; bilateral sphenoethmoid recesses clear. Nasopharynx clear. Scope was then progressed to the larynx; thick secretions noted along lingual service of epiglottis and post-cricoid space; bilateral true vocal folds mobile, airway widely patent. Moderate-severe cobblestoning along posterior pharyngeal wall.   Scope was then removed and patient tolerated to procedure well without complication.     Dispo: Patient instructed to follow up as instructed within assessment and plan portion of this notation.       Assessment/Plan:     Chronic sinusitis: start XHANCE BID and saline rinses daily. CT reviewed, patient will likely require ESS, will discuss further on return to clinic.   Hyposmia: likely secondary to CRS. Start XHANCE BID.   Postnasal drip: start saline spray BID.      Dr. Adam Schriedel, MD, thank you for involving me in this patient's care. Please contact me with further questions or concerns.    Sulma Mckeon MD         [1]   Past Medical History:   Acidosis    Acute bronchitis    Acute upper respiratory infections of unspecified site    Alpha-1-antitrypsin deficiency (HCC)    Chronic cough    Food intolerance    Had milk alergy my whole life    Iron deficiency    Laboratory examination ordered as part of a routine general medical examination    Other dyspnea and respiratory abnormality    Other nonspecific abnormal serum enzyme levels    Alkaline Phosphatase Elevated    Preop examination    Prostatitis    Renal tubular acidosis    Sinusitis    Sputum production    Ulcer    Vitamin D deficiency    Wears glasses   [2]   Past Surgical History:  Procedure Laterality Date    Colonoscopy  12/06/2011    Colonoscopy  2007    Other surgical history      Ileostomy, created J pouch and connected to ileum                         Sigmoidoscopy,biopsy  N/A 12/23/2014    Procedure: FLEXIBLE SIGMOIDOSCOPY W/WO BIOPSY, W/WO POLYPECTOMY;  Surgeon: Juan Antonio Garcia MD;  [facility]: [facility]    Sigmoidoscopy,diagnostic  December 2016    Tonsillectomy     [3] [4] [5] [6]   Allergies  Allergen Reactions    Bees ANAPHYLAXIS    Food      Whey    Milk     Isoflavones DIARRHEA   [7]   Family History  Problem Relation Age of Onset    Cancer Other         Ovarian, Fam hx    Other (Parkinson's disease) Other         Fam hx

## 2025-06-10 ENCOUNTER — OFFICE VISIT (OUTPATIENT)
Facility: LOCATION | Age: 45
End: 2025-06-10
Payer: COMMERCIAL

## 2025-06-10 DIAGNOSIS — R43.8 HYPOSMIA: ICD-10-CM

## 2025-06-10 DIAGNOSIS — R09.82 POSTNASAL DRIP: ICD-10-CM

## 2025-06-10 DIAGNOSIS — J32.4 CHRONIC PANSINUSITIS: Primary | ICD-10-CM

## 2025-06-10 PROCEDURE — 99214 OFFICE O/P EST MOD 30 MIN: CPT | Performed by: OTOLARYNGOLOGY

## 2025-07-22 ENCOUNTER — OFFICE VISIT (OUTPATIENT)
Facility: LOCATION | Age: 45
End: 2025-07-22
Payer: COMMERCIAL

## 2025-07-22 DIAGNOSIS — J32.4 CHRONIC PANSINUSITIS: Primary | ICD-10-CM

## 2025-07-22 DIAGNOSIS — R09.82 POSTNASAL DRIP: ICD-10-CM

## 2025-07-22 DIAGNOSIS — R43.8 HYPOSMIA: ICD-10-CM

## 2025-07-22 PROCEDURE — 31231 NASAL ENDOSCOPY DX: CPT | Performed by: OTOLARYNGOLOGY

## 2025-07-22 PROCEDURE — 99214 OFFICE O/P EST MOD 30 MIN: CPT | Performed by: OTOLARYNGOLOGY

## 2025-07-22 NOTE — PROGRESS NOTES
Otolaryngology Consultation Note     HPI: 45 y/o M with CRS, hyposmia and PND. Here for follow up. Doing very well. Hyposmia improved, almost back to normal. Using XHANCE BID. Does saline rinses 5 times per week. Postnasal drainage improved. No other new complaints since last visit.      Past Medical History: Past Medical History[1]     Past Surgical History: Past Surgical History[2]     Medication: Scheduled Meds:Scheduled Medications[3]  Continuous Infusions:Medication Infusions[4]  PRN Meds:.PRN Medications[5]     Allergies:  Allergies[6]  Pertinent Family History: Family History[7]     Pertinent Social History:   Social History     Socioeconomic History    Marital status:      Spouse name: Not on file    Number of children: Not on file    Years of education: Not on file    Highest education level: Not on file   Occupational History    Not on file   Tobacco Use    Smoking status: Never     Passive exposure: Never    Smokeless tobacco: Never   Vaping Use    Vaping status: Never Used   Substance and Sexual Activity    Alcohol use: Yes     Comment: Socially 2-3 times a month    Drug use: No    Sexual activity: Yes     Partners: Female   Other Topics Concern     Service Not Asked    Blood Transfusions Not Asked    Caffeine Concern Yes    Occupational Exposure Not Asked    Hobby Hazards Not Asked    Sleep Concern Not Asked    Stress Concern Not Asked    Weight Concern Not Asked    Special Diet Not Asked    Back Care Not Asked    Exercise No    Bike Helmet Not Asked    Seat Belt Not Asked    Self-Exams Not Asked   Social History Narrative    Not on file     Social Drivers of Health     Food Insecurity: Not on file   Transportation Needs: Not on file   Stress: Not on file   Housing Stability: Not on file        Review of Systems:  Constitutional: Negative.  HENT: see above  Eyes: Negative.  Respiratory: Negative.  Cardiovascular: Negative.  Gastrointestinal: Negative.  Musculoskeletal: Negative.  Skin:  Negative.  Renal: Negative  Endocrine: Negative  Psychiatric/Behavioral: Negative.     Physical Examination:  Vitals: There were no vitals taken for this visit.     General: Breathing comfortably on room air while sitting up. Able to communicate verbally. Voice normal. Normal appearing body habitus.     Musculoskeletal: Head: Atraumatic and normocephalic.     Neck: Full ROM and able to extend without issues     Nose: No sinus tenderness bilaterally upon palpation. No obvious nasal deformity. No masses, rhinorrhea, epistaxis. Nasal septum, mucosa, and turbinates appear normal.      Eyes: Extraocular movements intact and pupils equally reactive to light stimulus. No spontaneous or gaze-evoked nystagmus. No proptosis or ecchymosis. VFI.      Neuro: CN 7 intact with symmetric mobility and strength. No loss of facial sensation.     Skin: Dry, normal turgor, normal color.     Psych: Alert and oriented to person/place/time. Normal affect, amiable      Procedure:  Otolaryngology Procedure     Patient name: Catalino Humphreys     YOB: 1952     Procedure: Flexible Fiberoptic Nasal Endoscopy     Indication: CRS     Anesthesia: Topical 1% lidocaine with oxymetazoline     Surgeon: Sulma Mckeon MD     Procedure detail: Benefits and risks discussed with patient, which include bleeding, pain, infection, damage to surrounding structures. Agreement obtained from patient. Patient, while sitting up, was anesthetized with topical anesthetic. This was allowed to sit for 5 minutes. A lubricated flexible fiberoptic endoscopy was inserted through the nasal cavity to the level of the nasopharynx with findings as follows: No masses or lesions in nasal cavities; bilateral inferior, middle and superior turbinates intact; bilateral inferior, middle and superior meati clear; bilateral sphenoethmoid recesses clear. Nasopharynx clear  Scope was then removed and patient tolerated to procedure well without complication.     Dispo:  Patient instructed to follow up as instructed within assessment and plan portion of this notation.        Assessment/Plan:      Chronic sinusitis: stable, overall improved. Cont XHANCE BID and saline rinses daily. Previous CT with sinusitis. Recommend ESS if symptoms worsen. No indication for surgery at this time. Cont to monitor with serial nasal endoscopies.  Hyposmia: stable, improved, cont XHANCE BID. Will monitor  Postnasal drip: stable, cont saline spray BID.        Sulma Mckeon MD         [1]   Past Medical History:   Acidosis    Acute bronchitis    Acute upper respiratory infections of unspecified site    Alpha-1-antitrypsin deficiency (HCC)    Chronic cough    Food intolerance    Had milk alergy my whole life    Iron deficiency    Laboratory examination ordered as part of a routine general medical examination    Other dyspnea and respiratory abnormality    Other nonspecific abnormal serum enzyme levels    Alkaline Phosphatase Elevated    Preop examination    Prostatitis    Renal tubular acidosis    Sinusitis    Sputum production    Ulcer    Vitamin D deficiency    Wears glasses   [2]   Past Surgical History:  Procedure Laterality Date    Colonoscopy  12/06/2011    Colonoscopy  2007    Other surgical history      Ileostomy, created J pouch and connected to ileum                         Sigmoidoscopy,biopsy N/A 12/23/2014    Procedure: FLEXIBLE SIGMOIDOSCOPY W/WO BIOPSY, W/WO POLYPECTOMY;  Surgeon: Juan Antonio Garcia MD;  Location: [facility] [place]    Sigmoidoscopy,diagnostic  December 2016    Tonsillectomy     [3] [4] [5] [6]   Allergies  Allergen Reactions    Bees ANAPHYLAXIS    Food      Whey    Milk     Isoflavones DIARRHEA   [7]   Family History  Problem Relation Age of Onset    Cancer Other         Ovarian, Fam hx    Other (Parkinson's disease) Other         Fam hx

## 2025-08-22 ENCOUNTER — TELEPHONE (OUTPATIENT)
Facility: CLINIC | Age: 45
End: 2025-08-22

## 2025-08-22 ENCOUNTER — OFFICE VISIT (OUTPATIENT)
Facility: CLINIC | Age: 45
End: 2025-08-22

## 2025-08-22 VITALS
BODY MASS INDEX: 23.25 KG/M2 | HEART RATE: 57 BPM | SYSTOLIC BLOOD PRESSURE: 122 MMHG | DIASTOLIC BLOOD PRESSURE: 80 MMHG | HEIGHT: 69 IN | RESPIRATION RATE: 16 BRPM | OXYGEN SATURATION: 100 % | WEIGHT: 157 LBS

## 2025-08-22 DIAGNOSIS — E88.01 ALPHA-1-ANTITRYPSIN DEFICIENCY (HCC): ICD-10-CM

## 2025-08-22 DIAGNOSIS — J47.9 BRONCHIECTASIS WITHOUT COMPLICATION (HCC): Primary | ICD-10-CM

## 2025-08-22 PROCEDURE — 99214 OFFICE O/P EST MOD 30 MIN: CPT | Performed by: INTERNAL MEDICINE

## 2025-08-22 RX ORDER — FLUTICASONE FUROATE, UMECLIDINIUM BROMIDE AND VILANTEROL TRIFENATATE 200; 62.5; 25 UG/1; UG/1; UG/1
1 POWDER RESPIRATORY (INHALATION) DAILY
Qty: 1 EACH | Refills: 11 | Status: SHIPPED | OUTPATIENT
Start: 2025-08-22

## (undated) NOTE — MR AVS SNAPSHOT
[facility]  [address] [address] [phone]               Thank you for choosing us for your [facility] care visit with [facility].   We are glad to serve you and happy to provide you with this summary of yo comfortable. Use a hot water bottle or a hand towel dipped in hot water. Some people also find ice packs effective for relieving pain. Medicines  Your doctor may prescribe medications to help treat your sinusitis.  If you have an infection, antibiotics can · Certain autoimmune diseases such as rheumatoid arthritis  · Blocked airway, such as from a tumor or inhaled object  · Lung problems that are present at birth (congenital)  Symptoms of bronchiectasis  Damage to the airways often starts in childhood.  You m This list is accurate as of: 6/16/17 10:53 AM.  Always use your most recent med list.                Albuterol Sulfate  (90 Base) MCG/ACT Aers   Inhale 2 puffs into the lungs every 6 (six) hours as needed for Wheezing.            Amoxicillin-Pot Clav not sign up before the expiration date, you must request a new code. Your unique s0cket Access Code: [email or web address]  Expires: 8/15/2017 10:53 AM    If you have questions, you can call [phone] to talk to our [facility] Staff.  Remember, [employer]

## (undated) NOTE — MR AVS SNAPSHOT
[address] [facility]  [place] [address]  [phone]               Thank you for choosing us for your health care visit with Gm Bustamante PA-C.   We are glad to serve you and happy to provide you with moist and make you feel more comfortable. Remember to follow your doctor's instructions closely. This can make a big difference in getting your sinus problem under control. Drink fluids  Drinking extra fluids helps thin your mucus.  This lets it drain from Current Medications          This list is accurate as of: 3/25/17 11:56 AM.  Always use your most recent med list.                Albuterol Sulfate  (90 Base) MCG/ACT Aers   Inhale 2 puffs into the lungs every 6 (six) hours as needed for Wheezing. Support Staff. Remember, MyChart is NOT to be used for urgent needs. For medical emergencies, dial 911.            Visit [facility] online at  [email or web address]

## (undated) NOTE — Clinical Note
Dear Dr mAy Tran,  Thank you for allowing me to serve in the care of your patient, Carol Ann Caputo at [address] [facility] on 3/25/2017.  We value our relationship with you and appreciate your confidence in ou